# Patient Record
Sex: MALE | Race: WHITE | NOT HISPANIC OR LATINO | Employment: FULL TIME | URBAN - METROPOLITAN AREA
[De-identification: names, ages, dates, MRNs, and addresses within clinical notes are randomized per-mention and may not be internally consistent; named-entity substitution may affect disease eponyms.]

---

## 2017-11-13 ENCOUNTER — HISTORICAL (OUTPATIENT)
Dept: ADMINISTRATIVE | Facility: HOSPITAL | Age: 67
End: 2017-11-13

## 2017-11-14 ENCOUNTER — HISTORICAL (OUTPATIENT)
Dept: ADMINISTRATIVE | Facility: HOSPITAL | Age: 67
End: 2017-11-14

## 2017-11-14 LAB
BUN SERPL-MCNC: 14 MG/DL (ref 7–18)
CALCIUM SERPL-MCNC: 8.9 MG/DL (ref 8.5–10.1)
CHLORIDE SERPL-SCNC: 104 MMOL/L (ref 98–107)
CHOLEST SERPL-MCNC: 202 MG/DL (ref 0–200)
CHOLEST/HDLC SERPL: 3.5 {RATIO} (ref 0–5)
CO2 SERPL-SCNC: 29 MMOL/L (ref 21–32)
CREAT SERPL-MCNC: 1.04 MG/DL (ref 0.7–1.3)
GLUCOSE SERPL-MCNC: 114 MG/DL (ref 74–106)
HDLC SERPL-MCNC: 57 MG/DL (ref 35–60)
LDLC SERPL CALC-MCNC: 107 MG/DL (ref 0–129)
POTASSIUM SERPL-SCNC: 4.3 MMOL/L (ref 3.5–5.1)
SODIUM SERPL-SCNC: 139 MMOL/L (ref 136–145)
TRIGL SERPL-MCNC: 191 MG/DL (ref 30–150)
VLDLC SERPL CALC-MCNC: 38 MG/DL

## 2018-02-12 ENCOUNTER — HISTORICAL (OUTPATIENT)
Dept: ADMINISTRATIVE | Facility: HOSPITAL | Age: 68
End: 2018-02-12

## 2018-02-12 LAB
ABS NEUT (OLG): 2.09 X10(3)/MCL (ref 2.1–9.2)
ALBUMIN SERPL-MCNC: 3.8 GM/DL (ref 3.4–5)
ALBUMIN/GLOB SERPL: 1.6 RATIO (ref 1.1–2)
ALP SERPL-CCNC: 79 UNIT/L (ref 50–136)
ALT SERPL-CCNC: 30 UNIT/L (ref 12–78)
AST SERPL-CCNC: 20 UNIT/L (ref 15–37)
BASOPHILS # BLD AUTO: 0 X10(3)/MCL (ref 0–0.2)
BASOPHILS NFR BLD AUTO: 1 %
BILIRUB SERPL-MCNC: 0.7 MG/DL (ref 0.2–1)
BILIRUBIN DIRECT+TOT PNL SERPL-MCNC: 0.1 MG/DL (ref 0–0.5)
BILIRUBIN DIRECT+TOT PNL SERPL-MCNC: 0.6 MG/DL (ref 0–0.8)
BUN SERPL-MCNC: 12 MG/DL (ref 7–18)
CALCIUM SERPL-MCNC: 8.9 MG/DL (ref 8.5–10.1)
CHLORIDE SERPL-SCNC: 104 MMOL/L (ref 98–107)
CHOLEST SERPL-MCNC: 163 MG/DL (ref 0–200)
CHOLEST/HDLC SERPL: 3.1 {RATIO} (ref 0–5)
CO2 SERPL-SCNC: 30 MMOL/L (ref 21–32)
CREAT SERPL-MCNC: 0.93 MG/DL (ref 0.7–1.3)
EOSINOPHIL # BLD AUTO: 0.1 X10(3)/MCL (ref 0–0.9)
EOSINOPHIL NFR BLD AUTO: 3 %
ERYTHROCYTE [DISTWIDTH] IN BLOOD BY AUTOMATED COUNT: 11.4 % (ref 11.5–17)
GLOBULIN SER-MCNC: 2.4 GM/DL (ref 2.4–3.5)
GLUCOSE SERPL-MCNC: 118 MG/DL (ref 74–106)
HCT VFR BLD AUTO: 46.9 % (ref 42–52)
HDLC SERPL-MCNC: 53 MG/DL (ref 35–60)
HGB BLD-MCNC: 15.5 GM/DL (ref 14–18)
LDLC SERPL CALC-MCNC: 92 MG/DL (ref 0–129)
LYMPHOCYTES # BLD AUTO: 1.4 X10(3)/MCL (ref 0.6–4.6)
LYMPHOCYTES NFR BLD AUTO: 34 %
MCH RBC QN AUTO: 32.4 PG (ref 27–31)
MCHC RBC AUTO-ENTMCNC: 33 GM/DL (ref 33–36)
MCV RBC AUTO: 97.9 FL (ref 80–94)
MONOCYTES # BLD AUTO: 0.4 X10(3)/MCL (ref 0.1–1.3)
MONOCYTES NFR BLD AUTO: 10 %
NEUTROPHILS # BLD AUTO: 2.09 X10(3)/MCL (ref 1.4–7.9)
NEUTROPHILS NFR BLD AUTO: 51 %
PLATELET # BLD AUTO: 221 X10(3)/MCL (ref 130–400)
PMV BLD AUTO: 10.6 FL (ref 9.4–12.4)
POTASSIUM SERPL-SCNC: 4.4 MMOL/L (ref 3.5–5.1)
PROT SERPL-MCNC: 6.2 GM/DL (ref 6.4–8.2)
PSA SERPL-MCNC: 0.98 NG/ML (ref 0–4)
RBC # BLD AUTO: 4.79 X10(6)/MCL (ref 4.7–6.1)
SODIUM SERPL-SCNC: 140 MMOL/L (ref 136–145)
TRIGL SERPL-MCNC: 92 MG/DL (ref 30–150)
VLDLC SERPL CALC-MCNC: 18 MG/DL
WBC # SPEC AUTO: 4.1 X10(3)/MCL (ref 4.5–11.5)

## 2018-05-22 ENCOUNTER — HISTORICAL (OUTPATIENT)
Dept: ADMINISTRATIVE | Facility: HOSPITAL | Age: 68
End: 2018-05-22

## 2018-07-11 ENCOUNTER — HISTORICAL (OUTPATIENT)
Dept: ADMINISTRATIVE | Facility: HOSPITAL | Age: 68
End: 2018-07-11

## 2018-08-23 ENCOUNTER — HISTORICAL (OUTPATIENT)
Dept: ADMINISTRATIVE | Facility: HOSPITAL | Age: 68
End: 2018-08-23

## 2018-08-23 LAB
BUN SERPL-MCNC: 21 MG/DL (ref 7–18)
CALCIUM SERPL-MCNC: 9.1 MG/DL (ref 8.5–10.1)
CHLORIDE SERPL-SCNC: 107 MMOL/L (ref 98–107)
CHOLEST SERPL-MCNC: 263 MG/DL (ref 0–200)
CHOLEST/HDLC SERPL: 5 {RATIO} (ref 0–5)
CO2 SERPL-SCNC: 30 MMOL/L (ref 21–32)
CREAT SERPL-MCNC: 0.98 MG/DL (ref 0.7–1.3)
CREAT/UREA NIT SERPL: 21.4
GLUCOSE SERPL-MCNC: 119 MG/DL (ref 74–106)
HDLC SERPL-MCNC: 53 MG/DL (ref 35–60)
LDLC SERPL CALC-MCNC: 160 MG/DL (ref 0–129)
POTASSIUM SERPL-SCNC: 4 MMOL/L (ref 3.5–5.1)
SODIUM SERPL-SCNC: 143 MMOL/L (ref 136–145)
TRIGL SERPL-MCNC: 250 MG/DL (ref 30–150)
VLDLC SERPL CALC-MCNC: 50 MG/DL

## 2018-11-20 ENCOUNTER — HISTORICAL (OUTPATIENT)
Dept: HEPATOLOGY | Facility: HOSPITAL | Age: 68
End: 2018-11-20

## 2018-11-20 LAB — POC CREATININE: 0.9 MG/DL (ref 0.6–1.3)

## 2018-11-28 ENCOUNTER — HISTORICAL (OUTPATIENT)
Dept: ADMINISTRATIVE | Facility: HOSPITAL | Age: 68
End: 2018-11-28

## 2018-11-28 LAB
CHOLEST SERPL-MCNC: 206 MG/DL (ref 0–200)
CHOLEST/HDLC SERPL: 3.7 {RATIO} (ref 0–5)
CRP SERPL HS-MCNC: 0.57 MG/L (ref 0–3)
EST. AVERAGE GLUCOSE BLD GHB EST-MCNC: 126 MG/DL
GLUCOSE P FAST SERPL-MCNC: 100 MG/DL (ref 70–115)
HBA1C MFR BLD: 6 % (ref 4.2–6.3)
HDLC SERPL-MCNC: 56 MG/DL (ref 35–60)
LDLC SERPL CALC-MCNC: 119 MG/DL (ref 0–129)
TRIGL SERPL-MCNC: 153 MG/DL (ref 30–150)
VLDLC SERPL CALC-MCNC: 31 MG/DL

## 2018-12-06 ENCOUNTER — HISTORICAL (OUTPATIENT)
Dept: PREADMISSION TESTING | Facility: HOSPITAL | Age: 68
End: 2018-12-06

## 2018-12-06 ENCOUNTER — HISTORICAL (OUTPATIENT)
Dept: LAB | Facility: HOSPITAL | Age: 68
End: 2018-12-06

## 2018-12-06 LAB
ABS NEUT (OLG): 1.85 X10(3)/MCL (ref 2.1–9.2)
ALBUMIN SERPL-MCNC: 4 GM/DL (ref 3.4–5)
ALBUMIN/GLOB SERPL: 1.6 {RATIO}
ALP SERPL-CCNC: 63 UNIT/L (ref 50–136)
ALT SERPL-CCNC: 30 UNIT/L (ref 12–78)
APTT PPP: 28.8 SECOND(S) (ref 24.8–36.9)
AST SERPL-CCNC: 16 UNIT/L (ref 15–37)
BASOPHILS # BLD AUTO: 0 X10(3)/MCL (ref 0–0.2)
BASOPHILS NFR BLD AUTO: 1 %
BILIRUB SERPL-MCNC: 1.1 MG/DL (ref 0.2–1)
BILIRUBIN DIRECT+TOT PNL SERPL-MCNC: 0.2 MG/DL (ref 0–0.2)
BILIRUBIN DIRECT+TOT PNL SERPL-MCNC: 0.9 MG/DL (ref 0–0.8)
BUN SERPL-MCNC: 20 MG/DL (ref 7–18)
CALCIUM SERPL-MCNC: 9.3 MG/DL (ref 8.5–10.1)
CHLORIDE SERPL-SCNC: 102 MMOL/L (ref 98–107)
CO2 SERPL-SCNC: 29 MMOL/L (ref 21–32)
CREAT SERPL-MCNC: 0.99 MG/DL (ref 0.7–1.3)
EOSINOPHIL # BLD AUTO: 0.2 X10(3)/MCL (ref 0–0.9)
EOSINOPHIL NFR BLD AUTO: 5 %
ERYTHROCYTE [DISTWIDTH] IN BLOOD BY AUTOMATED COUNT: 12.1 % (ref 11.5–17)
GLOBULIN SER-MCNC: 2.5 GM/DL (ref 2.4–3.5)
GLUCOSE SERPL-MCNC: 98 MG/DL (ref 74–106)
HCT VFR BLD AUTO: 45.4 % (ref 42–52)
HGB BLD-MCNC: 14.9 GM/DL (ref 14–18)
INR PPP: 1 (ref 0–1.3)
LYMPHOCYTES # BLD AUTO: 1.5 X10(3)/MCL (ref 0.6–4.6)
LYMPHOCYTES NFR BLD AUTO: 37 %
MCH RBC QN AUTO: 32.3 PG (ref 27–31)
MCHC RBC AUTO-ENTMCNC: 32.8 GM/DL (ref 33–36)
MCV RBC AUTO: 98.5 FL (ref 80–94)
MONOCYTES # BLD AUTO: 0.4 X10(3)/MCL (ref 0.1–1.3)
MONOCYTES NFR BLD AUTO: 11 %
NEUTROPHILS # BLD AUTO: 1.85 X10(3)/MCL (ref 2.1–9.2)
NEUTROPHILS NFR BLD AUTO: 45 %
PLATELET # BLD AUTO: 206 X10(3)/MCL (ref 130–400)
PMV BLD AUTO: 11.2 FL (ref 9.4–12.4)
POTASSIUM SERPL-SCNC: 4.2 MMOL/L (ref 3.5–5.1)
PROT SERPL-MCNC: 6.5 GM/DL (ref 6.4–8.2)
PROTHROMBIN TIME: 13.6 SECOND(S) (ref 12.2–14.7)
RBC # BLD AUTO: 4.61 X10(6)/MCL (ref 4.7–6.1)
SODIUM SERPL-SCNC: 141 MMOL/L (ref 136–145)
WBC # SPEC AUTO: 4.1 X10(3)/MCL (ref 4.5–11.5)

## 2018-12-18 ENCOUNTER — HISTORICAL (OUTPATIENT)
Dept: SURGERY | Facility: HOSPITAL | Age: 68
End: 2018-12-18

## 2019-04-08 ENCOUNTER — HISTORICAL (OUTPATIENT)
Dept: ADMINISTRATIVE | Facility: HOSPITAL | Age: 69
End: 2019-04-08

## 2019-04-08 LAB
APPEARANCE, UA: CLEAR
BACTERIA SPEC CULT: NORMAL /HPF
BILIRUB UR QL STRIP: NEGATIVE
COLOR UR: YELLOW
GLUCOSE (UA): NEGATIVE
HGB UR QL STRIP: NEGATIVE
KETONES UR QL STRIP: NEGATIVE
LEUKOCYTE ESTERASE UR QL STRIP: NEGATIVE
NITRITE UR QL STRIP: NEGATIVE
PH UR STRIP: 5.5 [PH] (ref 5–9)
PROT UR QL STRIP: NEGATIVE
RBC #/AREA URNS HPF: NORMAL /[HPF]
SP GR UR STRIP: 1.01 (ref 1–1.03)
SQUAMOUS EPITHELIAL, UA: NORMAL
UROBILINOGEN UR STRIP-ACNC: 0.2
WBC #/AREA URNS HPF: NORMAL /HPF

## 2019-04-10 ENCOUNTER — HISTORICAL (OUTPATIENT)
Dept: ADMINISTRATIVE | Facility: HOSPITAL | Age: 69
End: 2019-04-10

## 2019-06-25 ENCOUNTER — HISTORICAL (OUTPATIENT)
Dept: ADMINISTRATIVE | Facility: HOSPITAL | Age: 69
End: 2019-06-25

## 2019-06-25 LAB
ABS NEUT (OLG): 1.97 X10(3)/MCL (ref 2.1–9.2)
ALBUMIN SERPL-MCNC: 3.8 GM/DL (ref 3.4–5)
ALBUMIN/GLOB SERPL: 1.5 RATIO (ref 1.1–2)
ALP SERPL-CCNC: 70 UNIT/L (ref 50–136)
ALT SERPL-CCNC: 26 UNIT/L (ref 12–78)
APPEARANCE, UA: CLEAR
AST SERPL-CCNC: 18 UNIT/L (ref 15–37)
BACTERIA SPEC CULT: NORMAL /HPF
BASOPHILS # BLD AUTO: 0 X10(3)/MCL (ref 0–0.2)
BASOPHILS NFR BLD AUTO: 1 %
BILIRUB SERPL-MCNC: 0.6 MG/DL (ref 0.2–1)
BILIRUB UR QL STRIP: NEGATIVE
BILIRUBIN DIRECT+TOT PNL SERPL-MCNC: 0.2 MG/DL (ref 0–0.5)
BILIRUBIN DIRECT+TOT PNL SERPL-MCNC: 0.4 MG/DL (ref 0–0.8)
BUN SERPL-MCNC: 15 MG/DL (ref 7–18)
CALCIUM SERPL-MCNC: 9.4 MG/DL (ref 8.5–10.1)
CHLORIDE SERPL-SCNC: 108 MMOL/L (ref 98–107)
CHOLEST SERPL-MCNC: 165 MG/DL (ref 0–200)
CHOLEST/HDLC SERPL: 2.8 {RATIO} (ref 0–5)
CO2 SERPL-SCNC: 29 MMOL/L (ref 21–32)
COLOR UR: YELLOW
CREAT SERPL-MCNC: 1.01 MG/DL (ref 0.7–1.3)
EOSINOPHIL # BLD AUTO: 0.2 X10(3)/MCL (ref 0–0.9)
EOSINOPHIL NFR BLD AUTO: 4 %
ERYTHROCYTE [DISTWIDTH] IN BLOOD BY AUTOMATED COUNT: 12.4 % (ref 11.5–17)
EST. AVERAGE GLUCOSE BLD GHB EST-MCNC: 111 MG/DL
GLOBULIN SER-MCNC: 2.5 GM/DL (ref 2.4–3.5)
GLUCOSE (UA): NEGATIVE
GLUCOSE SERPL-MCNC: 111 MG/DL (ref 74–106)
HBA1C MFR BLD: 5.5 % (ref 4.2–6.3)
HCT VFR BLD AUTO: 45.3 % (ref 42–52)
HDLC SERPL-MCNC: 58 MG/DL (ref 35–60)
HGB BLD-MCNC: 14.7 GM/DL (ref 14–18)
HGB UR QL STRIP: NEGATIVE
KETONES UR QL STRIP: NEGATIVE
LDLC SERPL CALC-MCNC: 77 MG/DL (ref 0–129)
LEUKOCYTE ESTERASE UR QL STRIP: NEGATIVE
LYMPHOCYTES # BLD AUTO: 1.2 X10(3)/MCL (ref 0.6–4.6)
LYMPHOCYTES NFR BLD AUTO: 31 %
MCH RBC QN AUTO: 32 PG (ref 27–31)
MCHC RBC AUTO-ENTMCNC: 32.5 GM/DL (ref 33–36)
MCV RBC AUTO: 98.7 FL (ref 80–94)
MONOCYTES # BLD AUTO: 0.4 X10(3)/MCL (ref 0.1–1.3)
MONOCYTES NFR BLD AUTO: 12 %
NEUTROPHILS # BLD AUTO: 1.97 X10(3)/MCL (ref 2.1–9.2)
NEUTROPHILS NFR BLD AUTO: 52 %
NITRITE UR QL STRIP: NEGATIVE
PH UR STRIP: 5 [PH] (ref 5–9)
PLATELET # BLD AUTO: 182 X10(3)/MCL (ref 130–400)
PMV BLD AUTO: 11 FL (ref 9.4–12.4)
POTASSIUM SERPL-SCNC: 4.3 MMOL/L (ref 3.5–5.1)
PROT SERPL-MCNC: 6.3 GM/DL (ref 6.4–8.2)
PROT UR QL STRIP: NEGATIVE
PSA SERPL-MCNC: 1.09 NG/ML (ref 0–4)
RBC # BLD AUTO: 4.59 X10(6)/MCL (ref 4.7–6.1)
RBC #/AREA URNS HPF: NORMAL /[HPF]
SODIUM SERPL-SCNC: 144 MMOL/L (ref 136–145)
SP GR UR STRIP: 1.02 (ref 1–1.03)
SQUAMOUS EPITHELIAL, UA: NORMAL
TRIGL SERPL-MCNC: 152 MG/DL (ref 30–150)
UROBILINOGEN UR STRIP-ACNC: 0.2
VLDLC SERPL CALC-MCNC: 30 MG/DL
WBC # SPEC AUTO: 3.8 X10(3)/MCL (ref 4.5–11.5)
WBC #/AREA URNS HPF: NORMAL /HPF

## 2019-07-01 ENCOUNTER — HISTORICAL (OUTPATIENT)
Dept: ADMINISTRATIVE | Facility: HOSPITAL | Age: 69
End: 2019-07-01

## 2019-07-02 ENCOUNTER — HISTORICAL (OUTPATIENT)
Dept: RADIOLOGY | Facility: HOSPITAL | Age: 69
End: 2019-07-02

## 2019-07-02 LAB — POC CREATININE: 0.8 MG/DL (ref 0.6–1.3)

## 2019-12-26 ENCOUNTER — HISTORICAL (OUTPATIENT)
Dept: RADIOLOGY | Facility: HOSPITAL | Age: 69
End: 2019-12-26

## 2019-12-26 LAB — POC CREATININE: 0.9 MG/DL (ref 0.6–1.3)

## 2020-01-03 ENCOUNTER — HOSPITAL ENCOUNTER (OUTPATIENT)
Dept: MEDSURG UNIT | Facility: HOSPITAL | Age: 70
End: 2020-01-05
Attending: INTERNAL MEDICINE | Admitting: INTERNAL MEDICINE

## 2020-01-03 LAB
ABS NEUT (OLG): 6.97 X10(3)/MCL (ref 2.1–9.2)
ALBUMIN SERPL-MCNC: 3.9 GM/DL (ref 3.4–5)
ALBUMIN/GLOB SERPL: 1.3 RATIO (ref 1.1–2)
ALP SERPL-CCNC: 88 UNIT/L (ref 50–136)
ALT SERPL-CCNC: 37 UNIT/L (ref 12–78)
APPEARANCE, UA: CLEAR
AST SERPL-CCNC: 24 UNIT/L (ref 15–37)
BACTERIA SPEC CULT: ABNORMAL /HPF
BASOPHILS # BLD AUTO: 0 X10(3)/MCL (ref 0–0.2)
BASOPHILS NFR BLD AUTO: 0 %
BILIRUB SERPL-MCNC: 0.3 MG/DL (ref 0.2–1)
BILIRUB UR QL STRIP: NEGATIVE
BILIRUBIN DIRECT+TOT PNL SERPL-MCNC: 0.1 MG/DL (ref 0–0.5)
BILIRUBIN DIRECT+TOT PNL SERPL-MCNC: 0.2 MG/DL (ref 0–0.8)
BNP BLD-MCNC: 51 PG/ML (ref 0–125)
BUN SERPL-MCNC: 4 MG/DL (ref 7–18)
CALCIUM SERPL-MCNC: 9.2 MG/DL (ref 8.5–10.1)
CHLORIDE SERPL-SCNC: 104 MMOL/L (ref 98–107)
CO2 SERPL-SCNC: 27 MMOL/L (ref 21–32)
COLOR UR: YELLOW
CREAT SERPL-MCNC: 1.28 MG/DL (ref 0.7–1.3)
D DIMER PPP IA.FEU-MCNC: 427 NG/ML FEU (ref 0–500)
EOSINOPHIL # BLD AUTO: 0.1 X10(3)/MCL (ref 0–0.9)
EOSINOPHIL NFR BLD AUTO: 1 %
ERYTHROCYTE [DISTWIDTH] IN BLOOD BY AUTOMATED COUNT: 12.7 % (ref 11.5–17)
GLOBULIN SER-MCNC: 3 GM/DL (ref 2.4–3.5)
GLUCOSE (UA): NEGATIVE
GLUCOSE SERPL-MCNC: 120 MG/DL (ref 74–106)
HCT VFR BLD AUTO: 45.1 % (ref 42–52)
HGB BLD-MCNC: 14.4 GM/DL (ref 14–18)
HGB UR QL STRIP: NEGATIVE
KETONES UR QL STRIP: ABNORMAL
LEUKOCYTE ESTERASE UR QL STRIP: NEGATIVE
LIPASE SERPL-CCNC: 149 UNIT/L (ref 73–393)
LYMPHOCYTES # BLD AUTO: 2.2 X10(3)/MCL (ref 0.6–4.6)
LYMPHOCYTES NFR BLD AUTO: 22 %
MCH RBC QN AUTO: 32.6 PG (ref 27–31)
MCHC RBC AUTO-ENTMCNC: 31.9 GM/DL (ref 33–36)
MCV RBC AUTO: 102 FL (ref 80–94)
MONOCYTES # BLD AUTO: 0.8 X10(3)/MCL (ref 0.1–1.3)
MONOCYTES NFR BLD AUTO: 8 %
NEUTROPHILS # BLD AUTO: 6.97 X10(3)/MCL (ref 2.1–9.2)
NEUTROPHILS NFR BLD AUTO: 68 %
NITRITE UR QL STRIP: NEGATIVE
PH UR STRIP: 5 [PH] (ref 5–9)
PLATELET # BLD AUTO: 206 X10(3)/MCL (ref 130–400)
PMV BLD AUTO: 10.9 FL (ref 9.4–12.4)
POTASSIUM SERPL-SCNC: 4.8 MMOL/L (ref 3.5–5.1)
PROT SERPL-MCNC: 6.9 GM/DL (ref 6.4–8.2)
PROT UR QL STRIP: NEGATIVE
RBC # BLD AUTO: 4.42 X10(6)/MCL (ref 4.7–6.1)
RBC #/AREA URNS HPF: ABNORMAL /[HPF]
SODIUM SERPL-SCNC: 140 MMOL/L (ref 136–145)
SP GR UR STRIP: 1.03 (ref 1–1.03)
SQUAMOUS EPITHELIAL, UA: ABNORMAL
TROPONIN I SERPL-MCNC: <0.02 NG/ML (ref 0.02–0.49)
UROBILINOGEN UR STRIP-ACNC: 1
WBC # SPEC AUTO: 10.2 X10(3)/MCL (ref 4.5–11.5)
WBC #/AREA URNS HPF: ABNORMAL /HPF

## 2020-01-04 LAB
ABS NEUT (OLG): 10.55 X10(3)/MCL (ref 2.1–9.2)
APTT PPP: 24 SECOND(S) (ref 24.2–33.9)
BASOPHILS # BLD AUTO: 0 X10(3)/MCL (ref 0–0.2)
BASOPHILS NFR BLD AUTO: 0 %
EOSINOPHIL # BLD AUTO: 0 X10(3)/MCL (ref 0–0.9)
EOSINOPHIL NFR BLD AUTO: 0 %
ERYTHROCYTE [DISTWIDTH] IN BLOOD BY AUTOMATED COUNT: 12.7 % (ref 11.5–17)
HCT VFR BLD AUTO: 41.5 % (ref 42–52)
HGB BLD-MCNC: 13.7 GM/DL (ref 14–18)
INR PPP: 0.9 (ref 0–1.3)
LYMPHOCYTES # BLD AUTO: 1.6 X10(3)/MCL (ref 0.6–4.6)
LYMPHOCYTES NFR BLD AUTO: 12 %
MCH RBC QN AUTO: 33.5 PG (ref 27–31)
MCHC RBC AUTO-ENTMCNC: 33 GM/DL (ref 33–36)
MCV RBC AUTO: 101.5 FL (ref 80–94)
MONOCYTES # BLD AUTO: 0.6 X10(3)/MCL (ref 0.1–1.3)
MONOCYTES NFR BLD AUTO: 5 %
NEUTROPHILS # BLD AUTO: 10.55 X10(3)/MCL (ref 2.1–9.2)
NEUTROPHILS NFR BLD AUTO: 82 %
PLATELET # BLD AUTO: 199 X10(3)/MCL (ref 130–400)
PMV BLD AUTO: 10.7 FL (ref 9.4–12.4)
PROTHROMBIN TIME: 12.4 SECOND(S) (ref 12–14)
RBC # BLD AUTO: 4.09 X10(6)/MCL (ref 4.7–6.1)
WBC # SPEC AUTO: 12.9 X10(3)/MCL (ref 4.5–11.5)

## 2020-01-05 LAB
ABS NEUT (OLG): 9.14 X10(3)/MCL (ref 2.1–9.2)
BASOPHILS # BLD AUTO: 0 X10(3)/MCL (ref 0–0.2)
BASOPHILS NFR BLD AUTO: 0 %
ERYTHROCYTE [DISTWIDTH] IN BLOOD BY AUTOMATED COUNT: 12.7 % (ref 11.5–17)
HCT VFR BLD AUTO: 36.6 % (ref 42–52)
HGB BLD-MCNC: 11.9 GM/DL (ref 14–18)
LYMPHOCYTES # BLD AUTO: 1 X10(3)/MCL (ref 0.6–4.6)
LYMPHOCYTES NFR BLD AUTO: 9 %
MCH RBC QN AUTO: 33 PG (ref 27–31)
MCHC RBC AUTO-ENTMCNC: 32.5 GM/DL (ref 33–36)
MCV RBC AUTO: 101.4 FL (ref 80–94)
MONOCYTES # BLD AUTO: 0.6 X10(3)/MCL (ref 0.1–1.3)
MONOCYTES NFR BLD AUTO: 6 %
NEUTROPHILS # BLD AUTO: 9.14 X10(3)/MCL (ref 2.1–9.2)
NEUTROPHILS NFR BLD AUTO: 84 %
PLATELET # BLD AUTO: 168 X10(3)/MCL (ref 130–400)
PMV BLD AUTO: 10.9 FL (ref 9.4–12.4)
RBC # BLD AUTO: 3.61 X10(6)/MCL (ref 4.7–6.1)
WBC # SPEC AUTO: 10.9 X10(3)/MCL (ref 4.5–11.5)

## 2020-01-07 ENCOUNTER — HISTORICAL (OUTPATIENT)
Dept: ADMINISTRATIVE | Facility: HOSPITAL | Age: 70
End: 2020-01-07

## 2020-01-10 ENCOUNTER — HISTORICAL (OUTPATIENT)
Dept: ADMINISTRATIVE | Facility: HOSPITAL | Age: 70
End: 2020-01-10

## 2020-01-10 LAB
ABS NEUT (OLG): 4.78 X10(3)/MCL (ref 2.1–9.2)
BASOPHILS # BLD AUTO: 0 X10(3)/MCL (ref 0–0.2)
BASOPHILS NFR BLD AUTO: 1 %
EOSINOPHIL # BLD AUTO: 0.1 X10(3)/MCL (ref 0–0.9)
EOSINOPHIL NFR BLD AUTO: 2 %
ERYTHROCYTE [DISTWIDTH] IN BLOOD BY AUTOMATED COUNT: 13.2 % (ref 11.5–17)
FERRITIN SERPL-MCNC: 253.9 NG/ML (ref 8–388)
HCT VFR BLD AUTO: 41.1 % (ref 42–52)
HGB BLD-MCNC: 13 GM/DL (ref 14–18)
IRON SATN MFR SERPL: 28.4 % (ref 20–50)
IRON SERPL-MCNC: 84 MCG/DL (ref 50–175)
LYMPHOCYTES # BLD AUTO: 1.3 X10(3)/MCL (ref 0.6–4.6)
LYMPHOCYTES NFR BLD AUTO: 19 %
MCH RBC QN AUTO: 32.5 PG (ref 27–31)
MCHC RBC AUTO-ENTMCNC: 31.6 GM/DL (ref 33–36)
MCV RBC AUTO: 102.8 FL (ref 80–94)
MONOCYTES # BLD AUTO: 0.6 X10(3)/MCL (ref 0.1–1.3)
MONOCYTES NFR BLD AUTO: 9 %
NEUTROPHILS # BLD AUTO: 4.78 X10(3)/MCL (ref 2.1–9.2)
NEUTROPHILS NFR BLD AUTO: 67 %
PLATELET # BLD AUTO: 206 X10(3)/MCL (ref 130–400)
PMV BLD AUTO: 10.3 FL (ref 9.4–12.4)
RBC # BLD AUTO: 4 X10(6)/MCL (ref 4.7–6.1)
TIBC SERPL-MCNC: 296 MCG/DL (ref 250–450)
TRANSFERRIN SERPL-MCNC: 220 MG/DL (ref 200–360)
WBC # SPEC AUTO: 7.1 X10(3)/MCL (ref 4.5–11.5)

## 2020-02-07 ENCOUNTER — HISTORICAL (OUTPATIENT)
Dept: CARDIOLOGY | Facility: HOSPITAL | Age: 70
End: 2020-02-07

## 2020-02-19 ENCOUNTER — HISTORICAL (OUTPATIENT)
Dept: ADMINISTRATIVE | Facility: HOSPITAL | Age: 70
End: 2020-02-19

## 2020-02-19 LAB
BUN SERPL-MCNC: 15 MG/DL (ref 7–18)
CALCIUM SERPL-MCNC: 8.9 MG/DL (ref 8.5–10.1)
CHLORIDE SERPL-SCNC: 108 MMOL/L (ref 98–107)
CHOLEST SERPL-MCNC: 163 MG/DL (ref 0–200)
CHOLEST/HDLC SERPL: 2.5 {RATIO} (ref 0–5)
CO2 SERPL-SCNC: 28 MMOL/L (ref 21–32)
CREAT SERPL-MCNC: 0.85 MG/DL (ref 0.7–1.3)
CREAT/UREA NIT SERPL: 17.6
EST. AVERAGE GLUCOSE BLD GHB EST-MCNC: 100 MG/DL
GLUCOSE SERPL-MCNC: 109 MG/DL (ref 74–106)
HBA1C MFR BLD: 5.1 % (ref 4.2–6.3)
HDLC SERPL-MCNC: 64 MG/DL (ref 35–60)
LDLC SERPL CALC-MCNC: 81 MG/DL (ref 0–129)
POTASSIUM SERPL-SCNC: 4.3 MMOL/L (ref 3.5–5.1)
SODIUM SERPL-SCNC: 142 MMOL/L (ref 136–145)
TRIGL SERPL-MCNC: 88 MG/DL (ref 30–150)
VLDLC SERPL CALC-MCNC: 18 MG/DL

## 2020-06-30 LAB — CRC RECOMMENDATION EXT: NORMAL

## 2021-05-13 ENCOUNTER — HISTORICAL (OUTPATIENT)
Dept: ADMINISTRATIVE | Facility: HOSPITAL | Age: 71
End: 2021-05-13

## 2021-05-13 LAB
ABS NEUT (OLG): 1.98 X10(3)/MCL (ref 2.1–9.2)
ALBUMIN SERPL-MCNC: 4 GM/DL (ref 3.4–4.8)
ALBUMIN/GLOB SERPL: 1.7 RATIO (ref 1.1–2)
ALP SERPL-CCNC: 62 UNIT/L (ref 40–150)
ALT SERPL-CCNC: 21 UNIT/L (ref 0–55)
APPEARANCE, UA: CLEAR
AST SERPL-CCNC: 16 UNIT/L (ref 5–34)
BACTERIA SPEC CULT: NORMAL /HPF
BASOPHILS # BLD AUTO: 0 X10(3)/MCL (ref 0–0.2)
BASOPHILS NFR BLD AUTO: 1 %
BILIRUB SERPL-MCNC: 0.6 MG/DL
BILIRUB UR QL STRIP: NEGATIVE
BILIRUBIN DIRECT+TOT PNL SERPL-MCNC: 0.2 MG/DL (ref 0–0.5)
BILIRUBIN DIRECT+TOT PNL SERPL-MCNC: 0.4 MG/DL (ref 0–0.8)
BUN SERPL-MCNC: 18.7 MG/DL (ref 8.4–25.7)
CALCIUM SERPL-MCNC: 9.7 MG/DL (ref 8.8–10)
CHLORIDE SERPL-SCNC: 105 MMOL/L (ref 98–107)
CHOLEST SERPL-MCNC: 191 MG/DL
CHOLEST/HDLC SERPL: 4 {RATIO} (ref 0–5)
CO2 SERPL-SCNC: 28 MMOL/L (ref 23–31)
COLOR UR: YELLOW
CREAT SERPL-MCNC: 0.92 MG/DL (ref 0.73–1.18)
EOSINOPHIL # BLD AUTO: 0.2 X10(3)/MCL (ref 0–0.9)
EOSINOPHIL NFR BLD AUTO: 4 %
ERYTHROCYTE [DISTWIDTH] IN BLOOD BY AUTOMATED COUNT: 12.2 % (ref 11.5–17)
GLOBULIN SER-MCNC: 2.3 GM/DL (ref 2.4–3.5)
GLUCOSE (UA): NEGATIVE
GLUCOSE SERPL-MCNC: 120 MG/DL (ref 82–115)
HCT VFR BLD AUTO: 46.2 % (ref 42–52)
HDLC SERPL-MCNC: 53 MG/DL (ref 35–60)
HGB BLD-MCNC: 15.1 GM/DL (ref 14–18)
HGB UR QL STRIP: NEGATIVE
KETONES UR QL STRIP: NEGATIVE
LDLC SERPL CALC-MCNC: 111 MG/DL (ref 50–140)
LEUKOCYTE ESTERASE UR QL STRIP: NEGATIVE
LYMPHOCYTES # BLD AUTO: 1.1 X10(3)/MCL (ref 0.6–4.6)
LYMPHOCYTES NFR BLD AUTO: 29 %
MCH RBC QN AUTO: 32.8 PG (ref 27–31)
MCHC RBC AUTO-ENTMCNC: 32.7 GM/DL (ref 33–36)
MCV RBC AUTO: 100.4 FL (ref 80–94)
MONOCYTES # BLD AUTO: 0.5 X10(3)/MCL (ref 0.1–1.3)
MONOCYTES NFR BLD AUTO: 12 %
NEUTROPHILS # BLD AUTO: 1.98 X10(3)/MCL (ref 2.1–9.2)
NEUTROPHILS NFR BLD AUTO: 52 %
NITRITE UR QL STRIP: NEGATIVE
PH UR STRIP: 5.5 [PH] (ref 5–9)
PLATELET # BLD AUTO: 174 X10(3)/MCL (ref 130–400)
PMV BLD AUTO: 11.1 FL (ref 9.4–12.4)
POTASSIUM SERPL-SCNC: 4.6 MMOL/L (ref 3.5–5.1)
PROT SERPL-MCNC: 6.3 GM/DL (ref 5.8–7.6)
PROT UR QL STRIP: NEGATIVE
PSA SERPL-MCNC: 1.15 NG/ML
RBC # BLD AUTO: 4.6 X10(6)/MCL (ref 4.7–6.1)
RBC #/AREA URNS HPF: NORMAL /[HPF]
SODIUM SERPL-SCNC: 144 MMOL/L (ref 136–145)
SP GR UR STRIP: 1.02 (ref 1–1.03)
SQUAMOUS EPITHELIAL, UA: NORMAL /HPF (ref 0–4)
TRIGL SERPL-MCNC: 134 MG/DL (ref 34–140)
UROBILINOGEN UR STRIP-ACNC: 0.2
VLDLC SERPL CALC-MCNC: 27 MG/DL
WBC # SPEC AUTO: 3.8 X10(3)/MCL (ref 4.5–11.5)
WBC #/AREA URNS HPF: NORMAL /HPF

## 2022-01-14 ENCOUNTER — HISTORICAL (OUTPATIENT)
Dept: ADMINISTRATIVE | Facility: HOSPITAL | Age: 72
End: 2022-01-14

## 2022-01-14 LAB
BUN SERPL-MCNC: 19.4 MG/DL (ref 8.4–25.7)
CALCIUM SERPL-MCNC: 10.6 MG/DL (ref 8.7–10.5)
CHLORIDE SERPL-SCNC: 105 MMOL/L (ref 98–107)
CHOLEST SERPL-MCNC: 140 MG/DL
CHOLEST/HDLC SERPL: 2 {RATIO} (ref 0–5)
CO2 SERPL-SCNC: 29 MMOL/L (ref 23–31)
CREAT SERPL-MCNC: 1.01 MG/DL (ref 0.73–1.18)
CREAT/UREA NIT SERPL: 19
EST. AVERAGE GLUCOSE BLD GHB EST-MCNC: 111.2 MG/DL
GLUCOSE SERPL-MCNC: 109 MG/DL (ref 82–115)
HBA1C MFR BLD: 5.5 %
HDLC SERPL-MCNC: 56 MG/DL (ref 35–60)
LDLC SERPL CALC-MCNC: 66 MG/DL (ref 50–140)
POTASSIUM SERPL-SCNC: 4.9 MMOL/L (ref 3.5–5.1)
SODIUM SERPL-SCNC: 143 MMOL/L (ref 136–145)
TRIGL SERPL-MCNC: 92 MG/DL (ref 34–140)
VLDLC SERPL CALC-MCNC: 18 MG/DL

## 2022-04-10 ENCOUNTER — HISTORICAL (OUTPATIENT)
Dept: ADMINISTRATIVE | Facility: HOSPITAL | Age: 72
End: 2022-04-10
Payer: MEDICARE

## 2022-04-29 VITALS
WEIGHT: 179.44 LBS | OXYGEN SATURATION: 99 % | BODY MASS INDEX: 28.16 KG/M2 | DIASTOLIC BLOOD PRESSURE: 92 MMHG | HEIGHT: 67 IN | SYSTOLIC BLOOD PRESSURE: 169 MMHG
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
## 2022-04-30 NOTE — OP NOTE
DATE OF SURGERY:    12/18/2018    SURGEON:  Maximilian Hitchcock MD  ASSISTANT:  JULIET Alamo    PREOPERATIVE DIAGNOSES:    1. Left L2-3 herniated nucleus pulposus with radiculopathy.  2. Lumbar spinal stenosis L2-3 with neurogenic claudication.  3. Low back pain.  4. Lumbar degenerative disk disease.    POSTOPERATIVE DIAGNOSES:    1. Left L2-3 herniated nucleus pulposus with radiculopathy.  2. Lumbar spinal stenosis L2-3 with neurogenic claudication.  3. Low back pain.  4. Lumbar degenerative disk disease.    PROCEDURE PERFORMED:    1. Left L2-3 laminectomy, bilateral medial facetectomies, bilateral L3 foraminotomies, and left L2-3 microdiskectomy.  2. Operative microscopic dissection.    INDICATIONS FOR PROCEDURE:  Mr. Reddy Thapa is a 68-year-old male who presented to my clinic with complaints of back pain, bilateral lower extremity radiculopathy radiating down to the knee.  His leg pain was relieved with forward bent posture.  He rated his pain as 8/10 on average, 10/10 at worse.  He had been experiencing this for greater than 6-12 months.  Patient underwent anti-inflammatory administration for greater than 2 months, physical therapy for greater than 2 months.  He underwent application of cold and heat, and despite all these extensive conservative measures, he failed conservative therapy.  He had previously had a L4-S1 fusion.  He is intact on exam.  MRI of the lumbar spine noted L2-3 spinal canal stenosis.  He had a left-sided L2-3 disk herniation resulting in canal stenosis.  Patient was counseled extensively on his options.  He elected to undergo operative decompression.    PROCEDURE IN DETAIL:  After informed consent was obtained, patient was brought to the operating room, placed under general anesthesia, intubated by the anesthesia team, transferred to the operative table in the prone position, appropriately padded, prepped and draped in the usual sterile fashion.       I began the procedure by making  a dorsal midline incision fluoroscopically located over the L2-3 vertebral body level.  Dissection was carried down to the underlying dorsal lumbar fascia.  Left-sided paramedian incision was made.  Tubular dilatation occurred over the trailing edge of the L2 lamina.  Fluoroscopy confirmed level.     The operative microscope was brought in, where a high-speed pneumatic bur was used to perform hemilaminectomy and medial facetectomy.  I carried the hemilaminectomy up to the cranial edge of the yellow ligament, undermined with a 6-0 angled curette, excised with a 2 Kerrison punch.  I identified the traversing L3 nerve root, decompressed it in the lateral recess with a 2 Kerrison punch, as well as the proximal neural foramen.  I then gently retracted this nerve root medially.  Very soft disk herniation was appreciated underneath.  An 11 blade was used to perform an annulotomy.  Pituitary rongeur removed the multiple small soft disk fragments.  Once the ventral surface of the canal was deemed decompressed, I was also able to decompress the right side of the canal using angulation and long nerve hooks and angled pituitaries.  There was no evidence of herniated disk within the canal.  The nerve was released.  The disk space was inspected.  It was very large, densely cartilaginous disk bulge that was across the entire disk space.  An 11 blade was used to incise this and a pituitary rongeur was used.  However, this was very fibrous and calcified, which was not amenable to removal because of its tenacious nature, this would have necessitated removal of the entire posterior disk, which would destabilize that segment.  Therefore, a decision was made not to perform right-sided micro disk because I felt I removed all the soft ventral disk from the unilateral approach.     I then proceeded to perform a hemilaminectomy on the right and medial facetectomy.  This was done by angling my tubular retractor medially.  I drilled off the  inferior portion of the spinous process as well as drilling off the inferior portion of the lamina on the contralateral side and now performed medial facetectomy with the high-speed pneumatic bur.  I then removed the contralateral yellow ligament with a combination of 2 and 3 Kerrison punches.  As I was not going to be looking at the disk at this right side, decision was made not to make a right-sided approach as this would further injure the facet capsule on the right and for stability reasons want to keep this intact.  Once the neural structures were deemed decompressed, copious irrigation washed out the entirety of the incision.  Complete hemostasis was achieved.  Tubular retractor was withdrawn, standard layered closure was performed.  All counts correct x2.    ESTIMATED BLOOD LOSS:  25 cc.    BLOOD REPLACED:  None.    SPECIMEN SENT:  None.    IMPLANTS:  None.      APPARENT OPERATIVE COMPLICATIONS:  None.  Neuromonitoring signals stayed stable throughout the entirety of the case.        ______________________________  MD JAMIA Powell/UN  DD:  12/18/2018  Time:  01:30PM  DT:  12/18/2018  Time:  03:02PM  Job #:  [id number]

## 2022-04-30 NOTE — ED PROVIDER NOTES
Patient:   Reddy Thapa            MRN: [id number]            FIN: [id number]               Age:   69 years     Sex:  Male     :  1950   Associated Diagnoses:   None   Author:   Stoney Cates MD      Addendum      Teaching-Supervisory Addendum-Brief   I participated in the following activities of this patients care: the medical history, the physical exam, medical decision making.   I personally performed: supervision of the patient's care, the medical history, the physical exam, the medical decision making.   The case was discussed with: midlevel provider.   Results interpretation: I agree with the study interpretation in this patient's care.   Notes: Dr. Cates dictating I have reviewed the mid-level note and agree with the findings. I performed an independent history and physical examination the patient had face-to-face time with the patient.    68 yo m c/o Lt upper abd pain, recent fits of coughing, tender indurated area in LUQ of abdomen on exam, on CT - rectus muscle hematoma with small possible active blush,  consulted and advised admit to  service..

## 2022-04-30 NOTE — DISCHARGE SUMMARY
Patient:   Reddy Thapa            MRN: [id number]            FIN: [id number]               Age:   69 years     Sex:  Male     :  1950   Associated Diagnoses:   None   Author:   Rachna HIGHTOWER, Zack      Discharge Information      Discharge Summary Information   Admit/Discharge Dates   Admit Date: 2020  Discharge Date: 2020     Physicians   Attending Physician - Reynaldo HIGHTOWER, Jessica CLEMENTE  Admitting Physician - Reynaldo HIGHTOWER, Jessica CLEMENTE  Consulting Physician - Rachna HIGHTOWER, Zack  Consulting Physician - Marissa ELAM MD, Reddy CARNES  Primary Care Physician - Axel HIGHTOWER, Brice GRESHAM     Discharge Diagnosis   Left rectus sheath hematoma ? Etiology     Acute bronchitis, prob viral     HTN - stable       Procedures   No procedures recorded for this visit.   Immunizations   No immunizations recorded for this visit.     Discharge Medications   Prescribed  codeine-guaifenesin (codeine-guaifenesin 10 mg-100 mg/5 mL oral syrup) 10 mL, Oral, q4hr, PRN as needed for cough  Continue  RABEprazole (RABEPRAZOLE DR 20MG TABLETS) 20 mg, Oral, qAM  aspirin (aspirin 81 mg oral Delayed Release (EC) tablet) 81 mg, Oral, Daily  atorvastatin (ATORVASTATIN 40MG TABLETS) 40 mg, Oral, qPM  escitalopram (ESCITALOPRAM 10 MG TABLET) 10 mg, Oral, qPM  fluticasone nasal (Flonase 50 mcg/inh nasal spray) 2 spray(s), Nasal, BID  metoprolol (METOPROLOL ER SUCCINATE 50MG TABS) 50 mg, Oral, qAM  Discontinue  amoxicillin (Amoxil 875 mg oral tablet) 875 mg, Oral, BID  predniSONE (Deltasone 20 mg oral tablet) 20 mg, Oral, BID        Education   Discharge - 20 8:40:00 CST, Home, Give all scheduled vaccinations prior to discharge.   Discharge Activity - Activity as Tolerated   Discharge Diet - Regular         Followup   Brice Reyna, within 1 to 2 weeks        Hospital Course   Hospital Course   Mr. Thapa is a 69 yr old male who presented to the ED with c/o mild generalized abdominal tenderness onset just prior to arrival. Also  reports a forceful cough ongoing intermittently for one month. Has been treated twice with steroids and antibiotics. CT abdomen/pelvis shows a left rectus sheath hematoma measuring about 8 cm with an area of possible active bleeding and bladder wall thickening with cystitis vs chronic outlet obstruction. CT chest negative for PE. Lung parenchyma showed areas of very mild atelectasis with no strong findings for pneumonia. Initial Hgb 14.4 with only a slight drop to 13.7. He's been seen by surgery and surgical intervention not warranted at this time. Abdominal binder has been ordered. Denies any abdominal trauma. On ASA 81mg daily but no other anticoagulants or antiplatelets. VS stable. Patient comfortable at this time. Pt was also placed on robitussin with codein for his cough. He had no signs and symptoms of sepsis. Pt did well overnight. He will be discharged to home today.   Explained in detail to patient and family about the discharge plan, medications and F/U visits. They understand agree with the treatment plan.   Condition stable  Diet: Low sodium   Meds per dc med rec  Activities as tolerated   F/U with PCP in 1 to 2 weeks  For further questions contact hospitalist office  NH 31 mts   .        Physical Examination   General:  Alert and oriented, No acute distress.    Respiratory:  Lungs are clear to auscultation, Breath sounds are equal.    Cardiovascular:  Normal rate, Regular rhythm, No murmur.    Gastrointestinal:  Soft, Non-tender, Non-distended, Normal bowel sounds.    Neurologic:  No focal deficits, Cranial Nerves II-XII are grossly intact.       Discharge Plan   Education and Follow-up   Counseled: patient, regarding diagnosis, regarding treatment, regarding medications.

## 2022-05-04 NOTE — HISTORICAL [facility] CERNER
This is a historical note converted from [employer]. Formatting and pictures may have been removed.  Please reference [employer] for original formatting and attached multimedia. Chief Complaint  abdominal pain, cough  History of Present Illness  Mr. Thapa is a 69 yr old male who presented to the ED with c/o mild generalized abdominal tenderness onset just prior to arrival. Also reports a forceful cough ongoing intermittently for one month. Has been treated twice with steroids and antibiotics. CT abdomen/pelvis shows a left rectus sheath hematoma measuring about 8 cm with an area of possible active bleeding and bladder wall thickening with cystitis vs chronic outlet obstruction. CT chest negative for PE. Lung?parenchyma showed areas of? very mild atelectasis with no?strong findings for pneumonia. Initial Hgb 14.4 with only a slight drop to 13.7. Hes been seen by surgery and surgical intervention not warranted at this time. Abdominal binder has been ordered. Denies any abdominal trauma. On ASA 81mg daily but no other anticoagulants or antiplatelets. VS stable. Patient comfortable at this time.  ?  Review of Systems  ?????  ????As per HPI otherwise all systems reviewed and negative.?  Physical Exam  Vitals & Measurements  T:?36.6? ?C (Oral)? TMIN:?36.6? ?C (Oral)? TMAX:?36.8? ?C (Oral)? HR:?61(Peripheral)? RR:?20? BP:?143/84? SpO2:?95%? WT:?82?kg? BMI:?28.37?  ????GENERAL: awake, alert, oriented, in no distress, calm, cooperative  ????HEENT: PERRL, no scleral icterus, mucous membranes moist  ????NECK: no JVD, no bruits  ????LUNGS: clear bilateral, unlabored  ????HEART: rate regular, rhythm regular, no murmur, no edema  ????GI: soft, nontender, no distension, normal bowel sounds  ????MS: normal ROM, no obvious deformities  ????SKIN: warm, dry, intact  ????NEURO: cranial nerves 2-12 grossly intact, no obvious focal deficits  Assessment/Plan  ?  Left rectus sheath hematoma  - monitor HH and if remains stable can d/c home  -  Abdominal binder  - Surgery has cleared for diet and to restart ASA  - Patient can expect hematoma to resolve over the next few weeks  - Has an appt with Dr. Reyna on Monday  ?  Acute bronchitis  - tessalon perles prn  ?  HTN - stable  ?  ?????  ????DVT prophylaxis: Patient is ambulatory  ????Code status: Full  ????PCP:?KANWAL Reyna MD  ???????  ?  ?I, Karolina Martin NP, discussed this case with Dr.?M. Briscoe.  ?   I Dr KARRI Briscoe assumed care of this patient today at?14:18  ?  For this patient encounter, I reviewed the NP/PA documentation, treatment plan and medical decision making; and I had face-to-face time with this patient  a. History?: cough x 1 month. Abdominal pain. CT showed a small hematoma. Pt denies any injury or trauma.  Afebrile. No SOB, fever or chills.  b. Physical exam  Lungs clear  Heart RRR  Abdomen soft and non tender  c. Medical decision making  Pts labs and vitals reviewed  Feeling better after getting the pain meds  Added Robitussin with codeine as needed  No signs of acute infection  CT chest negative for consolidation  No surgical indication for the superficial abdominal hematoma  Observe overnight and dc in am if stable  ?   All diagnosis and differential diagnosis have been reviewed; assement and plan has been documented; I have personally reviewed the labs and test results that are presently available; I have reviewed the patients medication list; I have reviewed the consulting providers response and recommendations. I have reviewed or attempted to review medical records based upon their availability.  ?   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  ??Social History: ?No tobacco or illicit drug use.?Quit smoking 7 yrs ago ago smoking a PPD for 40 yrs. Drinks?2-3 glasses of wine daily.  ?  Medications  Inpatient  acetaminophen, 1000 mg= 2 tab(s), Oral, q6hr, PRN  morphine 4 mg/mL preservative-free intravenous solution, 4 mg= 1 mL, IV Push, q4hr, PRN  Norco 7.5 mg-325 mg oral tablet, 1 tab(s), Oral, q4hr, PRN  Zofran, 4 mg= 2 mL, IV Push, q4hr, PRN  Home  Amoxil 875 mg oral tablet, 875 mg= 1 tab(s), Oral, BID  aspirin 81 mg oral Delayed Release (EC) tablet, 81 mg= 1 tab(s), Oral, Daily  ATORVASTATIN 40MG TABLETS, 40 mg= 1 tab(s), Oral, qPM  Deltasone 20 mg oral tablet, 20 mg= 1 tab(s), Oral, BID  ESCITALOPRAM 10 MG TABLET, 10 mg= 1 tab(s), Oral, qPM  Flonase 50 mcg/inh nasal spray, 2 spray(s), Nasal, BID  METOPROLOL ER SUCCINATE 50MG TABS, 50 mg= 1 tab(s), Oral, qAM  RABEPRAZOLE DR 20MG TABLETS, 20 mg= 1 tab(s), Oral, qAM  Allergies  No Known Allergies  Immunizations  Vaccine Date Status   tetanus/diphtheria/pertussis, acel(Tdap) 06/30/2019 Given   Lab Results  Labs Last 24 Hours?  ?Chemistry? Hematology/Coagulation?   Sodium Lvl: 140 mmol/L (01/03/20 22:00:00) D-Dimer: 427 ng/ml FEU (01/03/20 22:00:00)   Potassium Lvl: 4.8 mmol/L (01/03/20 22:00:00) WBC:?12.9 x10(3)/mcL?High (01/04/20 07:41:00)   Chloride: 104 mmol/L (01/03/20 22:00:00) RBC:?4.09 x10(6)/mcL?Low (01/04/20 07:41:00)   CO2: 27 mmol/L (01/03/20 22:00:00) Hgb:?13.7 gm/dL?Low (01/04/20 07:41:00)   Calcium Lvl: 9.2 mg/dL (01/03/20 22:00:00) Hct:?41.5 %?Low (01/04/20 07:41:00)   Glucose Lvl:?120 mg/dL?High (01/03/20 22:00:00) Platelet: 199 x10(3)/mcL (01/04/20 07:41:00)   BUN:?4 mg/dL?Low (01/03/20 22:00:00) MCV:?101.5 fL?High (01/04/20 07:41:00)   Creatinine: 1.28 mg/dL (01/03/20 22:00:00) MCH:?33.5 pg?High (01/04/20 07:41:00)   eGFR-AA: >60 (01/03/20 22:00:00) MCHC: 33 gm/dL (01/04/20 07:41:00)    eGFR-FELISHA: 59 mL/min/1.73 m2 (01/03/20 22:00:00) RDW: 12.7 % (01/04/20 07:41:00)   Bili Total: 0.3 mg/dL (01/03/20 22:00:00) MPV: 10.7 fL (01/04/20 07:41:00)   Bili Direct: 0.1 mg/dL (01/03/20 22:00:00) Abs Neut:?10.55 x10(3)/mcL?High (01/04/20 07:41:00)   Bili Indirect: 0.2 mg/dL (01/03/20 22:00:00) Neutro Auto: 82 % (01/04/20 07:41:00)   AST: 24 unit/L (01/03/20 22:00:00) Lymph Auto: 12 % (01/04/20 07:41:00)   ALT: 37 unit/L (01/03/20 22:00:00) Mono Auto: 5 % (01/04/20 07:41:00)   Alk Phos: 88 unit/L (01/03/20 22:00:00) Eos Auto: 0 % (01/04/20 07:41:00)   Total Protein: 6.9 gm/dL (01/03/20 22:00:00) Abs Eos: 0 x10(3)/mcL (01/04/20 07:41:00)   Albumin Lvl: 3.9 gm/dL (01/03/20 22:00:00) Basophil Auto: 0 % (01/04/20 07:41:00)   Globulin: 3 gm/dL (01/03/20 22:00:00) Abs Neutro:?10.55 x10(3)/mcL?High (01/04/20 07:41:00)   A/G Ratio: 1.3 ratio (01/03/20 22:00:00) Abs Lymph: 1.6 x10(3)/mcL (01/04/20 07:41:00)   BNP: 51 pg/mL (01/03/20 22:00:00) Abs Mono: 0.6 x10(3)/mcL (01/04/20 07:41:00)   Lipase Lvl: 149 unit/L (01/03/20 22:00:00) Abs Baso: 0 x10(3)/mcL (01/04/20 07:41:00)   Troponin-I: <0.02 (01/03/20 22:00:00)    Diagnostic Results  ?? ? ? ??  * Final Report *  ?  Reason For Exam  Pulmonary Embolism  ?  Radiology Report  CT Angio Chest PE W Contrast  ?  REASON FOR EXAM: Cough with left chest pain  ?  TECHNIQUE: CT imaging of the chest after the administration of IV  contrast. Axial, coronal and sagittal reconstruction, including MIP  images, are reviewed. Dose length product is 1129 mGycm. Automatic  exposure control, adjustment of mA/kV or iterative reconstruction  technique was used to limit radiation dose.  ?  COMPARISON: No relevant comparison studies available at the time of  dictation.  ?  FINDINGS:?  ?  Diagnostic quality: Adequate  ?  Pulmonary embolism: None identified.  ?  Lung parenchyma: Assessment slightly limited due to motion. Areas of  very mild atelectasis. No strong findings for  pneumonia.  ?  Pleural effusion: None.  ?  Adenopathy: No pathologically enlarged lymph node identified.  ?  Heart and great vessels: Mild enlargement of the heart. Mild aortic  calcifications. No significant pericardial fluid.  ?  Chest wall/axilla: Postsurgical changes involving the left anterior  chest wall.  ?  Bones: Few chronic left rib deformities.  ?  IMPRESSION: No pulmonary embolism identified.  ?  There is no significant discrepancy between my interpretation and the  preliminary radiology report.  ?  ?  Signature Line  Electronically Signed By: Vaibhav Flores MD  Date/Time Signed: 01/04/2020 08:32  ?? ? ? ??  * Final Report *  ?  Reason For Exam  Abdominal Pain  ?  Radiology Report  CT Abdomen and Pelvis W Contrast  ?  REASON FOR STUDY: Abdominal Pain?  ?  TECHNIQUE: CT imaging was performed of the abdomen and pelvis after  the administration of intravenous contrast. Dose length product is  1129 mGycm. Automatic exposure control, adjustment of mA/kV or  iterative reconstruction technique was used to limit radiation dose.  ?  COMPARISON: No relevant comparison studies available at the time of  dictation.?  ?  FINDINGS:?  ?  Liver: Normal.?  ?  Gallbladder and biliary tree: Several gallstones. No intra or  extrahepatic biliary ductal dilation.?  ?  Pancreas: Normal.  ?  Spleen: Normal.  ?  Adrenals: Normal.  ?  Kidneys and ureters: Symmetric renal enhancement. No hydronephrosis.  ?  Bladder: Underdistended with generalized wall thickening.  ?  Reproductive organs: Mildly enlarged prostate.  ?  Stomach/bowel: No evidence of bowel obstruction. Normal appendix.  Areas of colonic diverticulosis with no appreciable pericolonic  inflammation.?  ?  Lymph nodes: No pathologically enlarged lymph node identified.  ?  Peritoneum: No ascites or free air. No fluid collection.  ?  Vasculature: Mild calcifications within the abdominal aorta and iliac  arteries. No abdominal aortic aneurysm. The SMV, splenic vein and  main  portal vein are patent.?  ?  Abdominal wall: Hematoma within the left rectus sheath measures about  8 cm in transverse diameter.  ?  Bones: Postsurgical changes in the lower lumbar spine and pelvis. ? ?  ?  IMPRESSION:?  1. Left rectus sheath hematoma measuring about 8 cm in transverse  diameter. Linear hyperdense area within the hematoma may reflect a  site of active bleeding.  2. Bladder wall thickening may relate to cystitis or a component of  chronic outlet obstruction.  3. Cholelithiasis.  ?  The findings for this exam was discussed with Glenn Gant NP by the  [facility] staff radiologist at 0055 hours on 1/4/2020.  ?  There is no significant discrepancy between my interpretation and the  preliminary radiology report.  ?  ?  Signature Line  Electronically Signed By: Mark HIGHTOWER, Vaibhav ELIZONDO  Date/Time Signed: 01/04/2020 08:21  ?

## 2022-05-04 NOTE — HISTORICAL [facility]
This is a historical note converted from [employer]. Formatting and pictures may have been removed.  Please reference [employer] for original formatting and attached multimedia. Chief Complaint  NEW PATIENT HERE FOR LEFT HIP/BACK PAIN REFERRED BY DR SAVAGE. PATIENT STATES THE PAIN IS IN THE BACK OF HIS HIP AND SHOOTS TO HIS BACK  History of Present Illness  Left hip pain?in the groin and lateral aspect of the left hip?with certain?rotational motions.? No weightbearing pain.? No numbness or tingling.? Patient underwent an L4-S1?lumbar spinal fusion and instrumentation over 10 years ago.? No bowel or bladder dysfunction.  Review of Systems  Systemic: No fever, no chills, and no recent weight change.  Head: No headache - frequent.  Eyes: No vision problems.  Otolarnygeal: No hearing loss, no earache, no epistaxis, no hoarseness, and no tooth pain. Gums normal.  Cardiovascular: No chest pain or discomfort and no palpitations.  Pulmonary: No pulmonary symptoms - difficulty sleeping, no dyspnea, and cough not worse in the morning.  Gastrointestinal: Appetite not decreased. No dysphagia and no constant eructation. No nausea, no vomiting, no abdominal pain, no hematochezia, and no loose/mushy stools - frequent. No constipation - frequent.  Genitourinary: No genitourinary symptoms - Getting up every night to urinate and no increase in urinary frequency. No urinary hesitancy. No urinary loss of control - difficulty stopping urination and no burning sensation during urination.  Musculoskeletal: No calf muscle cramps and no localized soft tissue swelling of the ankle.  Neurological: No fainting and no convulsions.  Psychological: Not feeling nervous tension, not feeling nervous from exhaustion, and no depression.  Skin: No rash. Previous history of no ulcers.  Physical Exam  Vitals & Measurements  BP:?182/82?  HT:?168?cm? HT:?168?cm? WT:?79.3?kg? WT:?79.3?kg? BMI:?28.1?  Lumbar exam:  No swelling, no redness, no increased heat  No  atrophy  No tenderness  Well-healed incision?excellent?no evidence of infection  No tenderness with right or left lateral bend  No tenderness with forward flexion or?extension  Normal reflexes  Normal gait  2+ pulses  ?  General Appearance:  Well developed. In no acute distress. Awake, alert, and oriented to person, place, time, and situation. Antalgic gait.  ?  Pulses:  Arterial Pulses: Posterior tibialis pulses were normal. Dorsalis pedis pulses were normal.  ?  Musculoskeletal System:  Left Hip:  General: No erythema of the hip. No swelling, no increased heat. Skin with no wounds or lesions. Mild lateral tenderness over trochanteric bursa. Groin tenderness with internal and external rotation as well as weight-bearing.  ?  Left Hip:  Hip Motion: Value  Passive internal rotation _30 degrees  Passive external rotation 60_ degrees  Passive flexion _120 degrees  Passive abduction 45_ degrees  Passive adduction _20 degrees  Flexion contracture 0_ degrees  ? Pain was elicited by active internal rotation with the hip extended. ? Pain was elicited by active internal rotation with the hip flexed. ? No swelling. ? No induration. ? Greater trochanter was tender on palpation. ? Hip was tender on palpation. pain was elicited by active external rotation with the hip extended and flexed. ? Hip was not dislocated. ? Hip was not subluxed. ? Hip did not show laxity. ? No instability was noted. The hip was tender with ambulation.  Left Lower Leg:  Mild quadriceps atrophy.  ?  Neurological:  Sensation: intact distally in foot  Motor (Strength): Mild weakness of the right lower extremity was observed.  Gait And Stance: ? A Left sided antalgic gait was observed.  ?  Skin:  ? Normal. ? No ulcer was seen on the feet.  ?  Left Hip xrays: _Very mild degenerative changes left hip joint  ?  ?  Assessment/Plan  1.?Osteoarthritis of left hip  ? Home exercises for stretching and strengthening  NSAIDs over-the-counter?as  needed  Ordered:  Office/Outpatient Visit Level 2 New [id number] PC, Osteoarthritis of left hip  Lumbar degenerative disc disease, [facility], 07/11/18 10:48:00 CDT  ?  2.?Lumbar degenerative disc disease  Ordered:  Office/Outpatient Visit Level 2 New [id number] PC, Osteoarthritis of left hip  Lumbar degenerative disc disease, [facility], 07/11/18 10:48:00 CDT  ?  Low back pain  ?   Problem List/Past Medical History  Ongoing  Acid reflux  Arthritis  Cancer of oropharynx  Concussion  Hypercholesterolemia  Lumbar degenerative disc disease  Osteoarthritis of left hip  Historical  can lie flat  can walk 2 blocks briskly w/o CP/SOB  Cholelithiasis  Wears glasses  Procedure/Surgical History  Dilation and catheterization of salivary duct, with or without injection. (06/25/2014), Other operations on salivary gland or duct (06/25/2014), Probing of salivary duct (06/25/2014), Sialendoscopy (Right) (06/25/2014), Unlisted procedure, salivary glands or ducts. (06/25/2014), colonscopy, crainotomy for subdural hematoma d/t accident, h/o back surgery x2, Hernia repair, partial reml liver d/t injury, repair ruptured aorta d/t injury.  Medications  aspirin 81 mg oral Delayed Release (EC) tablet, 81 mg= 1 tab(s), Oral, Daily  ATORVASTATIN 20 MG TABLET, 20 mg= 1 tab(s), Oral, Daily  CIALIS 20MG TABLETS,? ?Not taking  Dexilant 60 mg oral delayed release capsule, 60 mg= 1 cap(s), Oral, Daily,? ?Not taking  EPINEPHRINE 0.3 MG AUTO-INJECT,? ?Not taking  ESCITALOPRAM 10 MG TABLET, 10 mg= 1 tab(s), Oral, qPM  METOPROLOL ER SUCCINATE 50MG TABS, 50 mg= 1 tab(s), Oral, qAM  Multivitamins and Minerals oral tablet, 1, Oral, Daily  PREDNISONE 10 MG TABLET,? ?Not taking  RABEPRAZOLE DR 20MG TABLETS, 20 mg= 1 tab(s), Oral, qAM  TRIAMTERENE 37.5MG/ HCTZ 25MG CAPS, 1 cap(s), Oral, Daily,? ?Not taking  Zofran ODT 4 mg oral tablet, disintegrating, 4 mg= 1 tab(s), Oral, TID, PRN,? ?Not taking  Allergies  No Known Allergies  Social  History  Alcohol  Current, Wine, 3-5 times per week, 06/18/2014  Employment/School  Employed, 06/18/2014  Substance Abuse - Denies Substance Abuse, 06/18/2014  Never, 06/02/2018  Tobacco  Former smoker, Cigars, 06/18/2014  Family History  Family history is unknown  Health Maintenance  Health Maintenance  ???Pending?(in the next year)  ??? ??OverDue  ??? ? ? ?Aspirin Therapy for CVD Prevention due??06/18/15??and every 1??year(s)  ??? ??Due?  ??? ? ? ?Abdominal Aortic Aneurysm Screening due??07/11/18??and every 100??year(s)  ??? ? ? ?Alcohol Misuse Screening due??07/11/18??and every 1??year(s)  ??? ? ? ?Colorectal Screening due??07/11/18??Variable frequency  ??? ? ? ?Functional Assessment due??07/11/18??and every 1??year(s)  ??? ? ? ?Pneumococcal Vaccine due??07/11/18??and every 100??year(s)  ??? ? ? ?Tetanus Vaccine due??07/11/18??and every 10??year(s)  ??? ? ? ?Zoster Vaccine due??07/11/18??and every 100??year(s)  ??? ??Due In Future?  ??? ? ? ?Influenza Vaccine not due until??10/02/18??and every 1??year(s)  ??? ? ? ?Lipid Screening not due until??02/12/19??and every 1??year(s)  ??? ? ? ?Fall Risk Assessment not due until??06/02/19??and every 1??year(s)  ???Satisfied?(in the past 1 year)  ??? ??Satisfied?  ??? ? ? ?Blood Pressure Screening on??07/11/18.??Satisfied by Shabnam Lake MA  ??? ? ? ?Body Mass Index Check on??07/11/18.??Satisfied by Shabnam Lake MA  ??? ? ? ?Depression Screening on??07/11/18.??Satisfied by Shabnam Lake MA  ??? ? ? ?Diabetes Screening on??06/02/18.??Satisfied by Jessica Amos  ??? ? ? ?Fall Risk Assessment on??06/02/18.??Satisfied by Colleen Quinones RN  ??? ? ? ?Lipid Screening on??02/12/18.??Satisfied by Mayra Ibarra  ??? ? ? ?Obesity Screening on??07/11/18.??Satisfied by Shabnam Lake MA  ??? ? ? ?Tobacco Use Screening on??07/11/18.??Satisfied by Shabnam Lake MA  ?  ?

## 2022-05-04 NOTE — HISTORICAL [facility] C[facility]ER
This is a historical note converted from [place]. Formatting and pictures may have been removed.  Please reference [employer] for original formatting and attached multimedia. Chief Complaint  C/O cough x 1.5 months, C/O rib pain with coughing and movement.  Reason for Consultation  rectus sheath hematoma  History of Present Illness  69M with pmh of HTN, HLD, on baby ASA, chronic back pain presents after two weeks of cough that caused a sudden left sided upper abdomen chest pain. The patient states he had a cough approximately one moth ago treated with ABX, but returned the week of christmas. He had significant coughing spells to the point of losing his breath that eventually caused a left sided sharp burning pain. The patient was evaluated in the ED with normal VS, laboratory work wnl, and CT scan showing rectus sheath hematoma.  Review of Systems  positive for cough, positive for left chest pain  Negative unless otherwise stated  Physical Exam  Vitals & Measurements  T:?36.6? ?C (Oral)? HR:?57(Peripheral)? RR:?20? BP:?131/77? SpO2:?95%? WT:?82?kg?  Gen: NAD  CV: peripheral pulses intact  Pulm: normal effort no accessory muscle use  Abd: soft, Left upper abdomen with firm, tender mass, approx 8 by 6  MSK: GLOVER  Neuro: GCS 15  Assessment/Plan  A: 69M with HTN, HLD, on baby asa presents with cough and rectus sheath hematoma  ?  P:  medicine eval  repeat labs  if patient drops his hgb recommend IR evaluation  surgery will follow, operative management typically reserved for hemodynamic instability   Problem List/Past Medical History  Ongoing  Acid reflux  Acute gastroenteritis  Arthritis  Cancer of oropharynx  Degenerative spondylolisthesis  Herniated nucleus pulposus, L2-3  Hypercholesterolemia  Hypertension  Lumbar degenerative disc disease  Lumbar radiculopathy  Osteoarthritis of left hip  Restless legs syndrome  S/P lumbar fusion  Trochanteric bursitis, left hip  Historical  can lie flat  can walk 2 blocks briskly w/o  CP/SOB  Cholelithiasis  Concussion  Wears glasses  Procedure/Surgical History  Repair Right Lower Leg Subcutaneous Tissue and Fascia, Open Approach (06/30/2019)  Simple repair of superficial wounds of scalp, neck, axillae, external genitalia, trunk and/or extremities (including hands and feet); 2.6 cm to 7.5 cm (06/30/2019)  Excision of Lumbar Vertebral Disc, Open Approach (12/18/2018)  Laminectomy Lumbar MIS (Bilateral) (12/18/2018)  Laminotomy (hemilaminectomy), with decompression of nerve root(s), including partial facetectomy, foraminotomy and/or excision of herniated intervertebral disc; 1 interspace, lumbar (12/18/2018)  Microdisecectomy Lumbar MIS (None) (12/18/2018)  Release Lumbar Nerve, Open Approach (12/18/2018)  Dilation and catheterization of salivary duct, with or without injection. (06/25/2014)  Other operations on salivary gland or duct (06/25/2014)  Probing of salivary duct (06/25/2014)  Sialendoscopy (Right) (06/25/2014)  Unlisted procedure, salivary glands or ducts. (06/25/2014)  colonscopy  crainotomy for subdural hematoma d/t accident  h/o back surgery x2  Hernia repair  partial reml liver d/t injury  repair ruptured aorta d/t injury   Medications  Inpatient  acetaminophen, 1000 mg= 2 tab(s), Oral, q6hr, PRN  Zofran, 4 mg= 2 mL, IV Push, q4hr, PRN  Home  aspirin 81 mg oral Delayed Release (EC) tablet, 81 mg= 1 tab(s), Oral, Daily  ATORVASTATIN 40MG TABLETS, 40 mg= 1 tab(s), Oral, qPM  Bentyl 10 mg oral capsule, 10 mg= 1 cap(s), Oral, QID, PRN,? ?Not taking  CYCLOBENZAPRINE 10MG TABLETS, 10 mg= 1 tab(s), Oral, QID  ESCITALOPRAM 10 MG TABLET, 10 mg= 1 tab(s), Oral, qPM  Flonase 50 mcg/inh nasal spray, 2 spray(s), Nasal, BID  GABAPENTIN 600 MG TABLET, 600 mg= 1 tab(s), Oral, TID  METOPROLOL ER SUCCINATE 50MG TABS, 50 mg= 1 tab(s), Oral, qAM  Multivitamins and Minerals oral tablet, 1, Oral, Daily  OXYCODONE-ACETAMINOPHEN 5-325, Oral, QID  RABEPRAZOLE DR 20MG TABLETS, 20 mg= 1 tab(s), Oral,  qAM  Zofran ODT 4 mg oral tablet, disintegrating, 4 mg= 1 tab(s), Oral, TID, PRN,? ?Not taking  Allergies  No Known Allergies  Social History  Abuse/Neglect  No, 01/03/2020  Alcohol  Current, Wine, 3-5 times per week, 06/18/2014  Employment/School  Employed, 06/18/2014  Substance Use - Denies Substance Abuse, 06/18/2014  Never, 06/02/2018  Tobacco  Former smoker, quit more than 30 days ago, N/A, 01/03/2020  Family History  Hypertension.: Father.  Immunizations  Vaccine Date Status   tetanus/diphtheria/pertussis, acel(Tdap) 06/30/2019 Given       Agree with above assessment and plan. Supportive care. Avoid anticoagulation. Trend H&H. Abdominal binder. No surgical or IR intervention required. Hematoma will resorb over coming weeks/months.

## 2022-05-26 DIAGNOSIS — F32.A DEPRESSION, UNSPECIFIED DEPRESSION TYPE: Primary | ICD-10-CM

## 2022-05-26 RX ORDER — ESCITALOPRAM OXALATE 10 MG/1
10 TABLET ORAL DAILY
Qty: 90 TABLET | Refills: 3 | Status: SHIPPED | OUTPATIENT
Start: 2022-05-26 | End: 2023-08-10

## 2022-07-20 ENCOUNTER — PATIENT OUTREACH (OUTPATIENT)
Dept: ADMINISTRATIVE | Facility: HOSPITAL | Age: 72
End: 2022-07-20
Payer: MEDICARE

## 2022-07-25 ENCOUNTER — TELEPHONE (OUTPATIENT)
Dept: INTERNAL MEDICINE | Facility: CLINIC | Age: 72
End: 2022-07-25
Payer: MEDICARE

## 2022-07-25 DIAGNOSIS — I10 HYPERTENSION, UNSPECIFIED TYPE: Primary | ICD-10-CM

## 2022-07-25 RX ORDER — VALSARTAN 320 MG/1
320 TABLET ORAL DAILY
COMMUNITY
Start: 2022-05-31 | End: 2022-07-25 | Stop reason: SDUPTHER

## 2022-07-25 RX ORDER — VALSARTAN 320 MG/1
320 TABLET ORAL DAILY
Qty: 90 TABLET | Refills: 1 | Status: SHIPPED | OUTPATIENT
Start: 2022-07-25 | End: 2023-03-20

## 2022-07-25 NOTE — TELEPHONE ENCOUNTER
----- Message from Ling Ng sent at 7/25/2022  2:22 PM CDT -----  Regarding: refill  Pt needs valsartan 320 mg once a day refilled at [facility]. His callback number is [phone]

## 2022-08-02 RX ORDER — METOPROLOL SUCCINATE 50 MG/1
TABLET, EXTENDED RELEASE ORAL
Qty: 90 TABLET | Refills: 2 | Status: SHIPPED | OUTPATIENT
Start: 2022-08-02 | End: 2023-04-25

## 2022-08-02 RX ORDER — ATORVASTATIN CALCIUM 40 MG/1
TABLET, FILM COATED ORAL
Qty: 90 TABLET | Refills: 2 | Status: SHIPPED | OUTPATIENT
Start: 2022-08-02 | End: 2023-04-25

## 2022-08-03 RX ORDER — HYDROXYZINE HYDROCHLORIDE 25 MG/1
TABLET, FILM COATED ORAL
Qty: 90 TABLET | Refills: 1 | Status: SHIPPED | OUTPATIENT
Start: 2022-08-03 | End: 2023-01-25

## 2022-08-04 ENCOUNTER — LAB VISIT (OUTPATIENT)
Dept: LAB | Facility: HOSPITAL | Age: 72
End: 2022-08-04
Attending: INTERNAL MEDICINE
Payer: MEDICARE

## 2022-08-04 DIAGNOSIS — K21.9 GASTROESOPHAGEAL REFLUX DISEASE, UNSPECIFIED WHETHER ESOPHAGITIS PRESENT: ICD-10-CM

## 2022-08-04 DIAGNOSIS — Z00.00 ROUTINE GENERAL MEDICAL EXAMINATION AT A HEALTH CARE FACILITY: Primary | ICD-10-CM

## 2022-08-04 DIAGNOSIS — E78.5 HYPERLIPIDEMIA, UNSPECIFIED HYPERLIPIDEMIA TYPE: ICD-10-CM

## 2022-08-04 DIAGNOSIS — Z12.5 SPECIAL SCREENING FOR MALIGNANT NEOPLASM OF PROSTATE: ICD-10-CM

## 2022-08-04 DIAGNOSIS — I10 ESSENTIAL HYPERTENSION, MALIGNANT: ICD-10-CM

## 2022-08-04 LAB
ALBUMIN SERPL-MCNC: 4.1 GM/DL (ref 3.4–4.8)
ALBUMIN/GLOB SERPL: 1.9 RATIO (ref 1.1–2)
ALP SERPL-CCNC: 76 UNIT/L (ref 40–150)
ALT SERPL-CCNC: 21 UNIT/L (ref 0–55)
APPEARANCE UR: CLEAR
AST SERPL-CCNC: 17 UNIT/L (ref 5–34)
BACTERIA #/AREA URNS AUTO: NORMAL /HPF
BASOPHILS # BLD AUTO: 0.04 X10(3)/MCL (ref 0–0.2)
BASOPHILS NFR BLD AUTO: 1.1 %
BILIRUB UR QL STRIP.AUTO: NEGATIVE MG/DL
BILIRUBIN DIRECT+TOT PNL SERPL-MCNC: 0.6 MG/DL
BUN SERPL-MCNC: 13.5 MG/DL (ref 8.4–25.7)
CALCIUM SERPL-MCNC: 9.2 MG/DL (ref 8.8–10)
CHLORIDE SERPL-SCNC: 105 MMOL/L (ref 98–107)
CHOLEST SERPL-MCNC: 140 MG/DL
CHOLEST/HDLC SERPL: 3 {RATIO} (ref 0–5)
CO2 SERPL-SCNC: 32 MMOL/L (ref 23–31)
COLOR UR AUTO: YELLOW
CREAT SERPL-MCNC: 0.96 MG/DL (ref 0.73–1.18)
EOSINOPHIL # BLD AUTO: 0.18 X10(3)/MCL (ref 0–0.9)
EOSINOPHIL NFR BLD AUTO: 5 %
ERYTHROCYTE [DISTWIDTH] IN BLOOD BY AUTOMATED COUNT: 13 % (ref 11.5–17)
GLOBULIN SER-MCNC: 2.2 GM/DL (ref 2.4–3.5)
GLUCOSE SERPL-MCNC: 98 MG/DL (ref 82–115)
GLUCOSE UR QL STRIP.AUTO: NEGATIVE MG/DL
HCT VFR BLD AUTO: 43.6 % (ref 42–52)
HDLC SERPL-MCNC: 51 MG/DL (ref 35–60)
HGB BLD-MCNC: 14.1 GM/DL (ref 14–18)
IMM GRANULOCYTES # BLD AUTO: 0.02 X10(3)/MCL (ref 0–0.04)
IMM GRANULOCYTES NFR BLD AUTO: 0.6 %
KETONES UR QL STRIP.AUTO: ABNORMAL MG/DL
LDLC SERPL CALC-MCNC: 61 MG/DL (ref 50–140)
LEUKOCYTE ESTERASE UR QL STRIP.AUTO: NEGATIVE UNIT/L
LYMPHOCYTES # BLD AUTO: 1.21 X10(3)/MCL (ref 0.6–4.6)
LYMPHOCYTES NFR BLD AUTO: 33.8 %
MCH RBC QN AUTO: 32.3 PG (ref 27–31)
MCHC RBC AUTO-ENTMCNC: 32.3 MG/DL (ref 33–36)
MCV RBC AUTO: 99.8 FL (ref 80–94)
MONOCYTES # BLD AUTO: 0.46 X10(3)/MCL (ref 0.1–1.3)
MONOCYTES NFR BLD AUTO: 12.8 %
NEUTROPHILS # BLD AUTO: 1.7 X10(3)/MCL (ref 2.1–9.2)
NEUTROPHILS NFR BLD AUTO: 46.7 %
NITRITE UR QL STRIP.AUTO: NEGATIVE
NRBC BLD AUTO-RTO: 0 %
PH UR STRIP.AUTO: 5 [PH]
PLATELET # BLD AUTO: 184 X10(3)/MCL (ref 130–400)
PMV BLD AUTO: 10.8 FL (ref 7.4–10.4)
POTASSIUM SERPL-SCNC: 4.6 MMOL/L (ref 3.5–5.1)
PROT SERPL-MCNC: 6.3 GM/DL (ref 5.8–7.6)
PROT UR QL STRIP.AUTO: NEGATIVE MG/DL
PSA SERPL-MCNC: 0.96 NG/ML
RBC # BLD AUTO: 4.37 X10(6)/MCL (ref 4.7–6.1)
RBC #/AREA URNS AUTO: <5 /HPF
RBC UR QL AUTO: NEGATIVE UNIT/L
SODIUM SERPL-SCNC: 143 MMOL/L (ref 136–145)
SP GR UR STRIP.AUTO: 1.02 (ref 1–1.03)
SQUAMOUS #/AREA URNS AUTO: <5 /HPF
TRIGL SERPL-MCNC: 139 MG/DL (ref 34–140)
UROBILINOGEN UR STRIP-ACNC: 1 MG/DL
VLDLC SERPL CALC-MCNC: 28 MG/DL
WBC # SPEC AUTO: 3.6 X10(3)/MCL (ref 4.5–11.5)
WBC #/AREA URNS AUTO: <5 /HPF

## 2022-08-04 PROCEDURE — 80053 COMPREHEN METABOLIC PANEL: CPT

## 2022-08-04 PROCEDURE — 36415 COLL VENOUS BLD VENIPUNCTURE: CPT

## 2022-08-04 PROCEDURE — 80061 LIPID PANEL: CPT

## 2022-08-04 PROCEDURE — 85025 COMPLETE CBC W/AUTO DIFF WBC: CPT

## 2022-08-04 PROCEDURE — 81001 URINALYSIS AUTO W/SCOPE: CPT

## 2022-08-04 PROCEDURE — 84153 ASSAY OF PSA TOTAL: CPT

## 2022-08-08 RX ORDER — ASPIRIN 81 MG/1
81 TABLET ORAL DAILY
COMMUNITY

## 2022-08-08 RX ORDER — RABEPRAZOLE SODIUM 20 MG/1
20 TABLET, DELAYED RELEASE ORAL DAILY
COMMUNITY
Start: 2022-08-03 | End: 2023-01-24

## 2022-08-10 ENCOUNTER — TELEPHONE (OUTPATIENT)
Dept: INTERNAL MEDICINE | Facility: CLINIC | Age: 72
End: 2022-08-10

## 2022-08-10 ENCOUNTER — OFFICE VISIT (OUTPATIENT)
Dept: INTERNAL MEDICINE | Facility: CLINIC | Age: 72
End: 2022-08-10
Payer: MEDICARE

## 2022-08-10 ENCOUNTER — HOSPITAL ENCOUNTER (OUTPATIENT)
Dept: RADIOLOGY | Facility: HOSPITAL | Age: 72
Discharge: HOME | End: 2022-08-10
Attending: INTERNAL MEDICINE
Payer: MEDICARE

## 2022-08-10 VITALS
SYSTOLIC BLOOD PRESSURE: 120 MMHG | WEIGHT: 187.63 LBS | HEART RATE: 68 BPM | HEIGHT: 67 IN | DIASTOLIC BLOOD PRESSURE: 78 MMHG | RESPIRATION RATE: 18 BRPM | BODY MASS INDEX: 29.45 KG/M2

## 2022-08-10 DIAGNOSIS — K21.9 GASTROESOPHAGEAL REFLUX DISEASE, UNSPECIFIED WHETHER ESOPHAGITIS PRESENT: ICD-10-CM

## 2022-08-10 DIAGNOSIS — E78.5 HYPERLIPIDEMIA, UNSPECIFIED HYPERLIPIDEMIA TYPE: ICD-10-CM

## 2022-08-10 DIAGNOSIS — C14.0 THROAT CANCER: ICD-10-CM

## 2022-08-10 DIAGNOSIS — I10 ESSENTIAL (PRIMARY) HYPERTENSION: ICD-10-CM

## 2022-08-10 DIAGNOSIS — I77.9 CAROTID ARTERY DISEASE, UNSPECIFIED LATERALITY, UNSPECIFIED TYPE: ICD-10-CM

## 2022-08-10 DIAGNOSIS — R06.02 SHORT OF BREATH ON EXERTION: ICD-10-CM

## 2022-08-10 DIAGNOSIS — Z00.00 WELLNESS EXAMINATION: Primary | ICD-10-CM

## 2022-08-10 DIAGNOSIS — K76.0 FATTY LIVER: ICD-10-CM

## 2022-08-10 DIAGNOSIS — R13.10 DYSPHAGIA, UNSPECIFIED TYPE: ICD-10-CM

## 2022-08-10 PROCEDURE — G0439 PPPS, SUBSEQ VISIT: HCPCS | Mod: ,,, | Performed by: INTERNAL MEDICINE

## 2022-08-10 PROCEDURE — 71046 X-RAY EXAM CHEST 2 VIEWS: CPT | Mod: TC

## 2022-08-10 PROCEDURE — G0439 PR MEDICARE ANNUAL WELLNESS SUBSEQUENT VISIT: ICD-10-PCS | Mod: ,,, | Performed by: INTERNAL MEDICINE

## 2022-08-10 NOTE — PROGRESS NOTES
Brice Reyna MD        PATIENT NAME: Reddy Thapa  : 1950  DATE: 8/10/22  MRN: [id number]      Billing Provider: Brice Reyna MD  Level of Service: [place] MEDICARE ANNUAL WELLNESS SUBSEQUENT VISIT  Patient PCP Information     Provider PCP Type    Brice Reyna MD General          Reason for Visit / Chief Complaint: Medicare AWV (Wellness, complaints anxiety and stress)       Update PCP  Update Chief Complaint         History of Present Illness / Problem Focused Workflow     Reddy Thapa presents to the clinic with Medicare AWV (Wellness, complaints anxiety and stress)     Reddy is here for his annual wellness exam  His main complaint is when he eats he feels is fullness I the food will go down spine going on for some time  He has chronic reflux disease  He also reports shortness of breath feeling in the morning when he gets up  He has reported this is cardiologist.      Review of Systems   Review of Systems   Respiratory: Positive for shortness of breath.    Gastrointestinal: Positive for reflux.        Medical / Social / Family History     Past Medical History:   Diagnosis Date    Essential (primary) hypertension     Fatty liver        Past Surgical History:   Procedure Laterality Date    BACK SURGERY      fusionLl3 and L4 cartliage    LAPAROSCOPIC REPAIR OF INGUINAL HERNIA Left     liver discection      ruptured aorta         Social History  Mr. Thapa  reports that he has never smoked. He has never used smokeless tobacco. He reports current alcohol use of about 3.0 standard drinks of alcohol per week. He reports that he does not use drugs.    Family History  Mr.'s Thapa  family history includes Alzheimer's disease in his father; COPD in his mother; Emphysema in his mother; Hyperlipidemia in his brother and sister.    Medications and Allergies     Medications  Outpatient Medications Marked as Taking for the 8/10/22 encounter (Office Visit) with Brice Reyna  MD   Medication Sig Dispense Refill    aspirin (ECOTRIN) 81 MG EC tablet Take 81 mg by mouth once daily.      atorvastatin (LIPITOR) 40 MG tablet TAKE 1 TABLET BY MOUTH EVERY DAY 90 tablet 2    EScitalopram oxalate (LEXAPRO) 10 MG tablet Take 1 tablet (10 mg total) by mouth once daily. 90 tablet 3    hydrOXYzine HCL (ATARAX) 25 MG tablet TAKE 1 TABLET BY MOUTH AT BEDTIME 90 tablet 1    metoprolol succinate (TOPROL-XL) 50 MG 24 hr tablet TAKE 1 TABLET BY MOUTH DAILY 90 tablet 2    RABEprazole (ACIPHEX) 20 mg tablet Take 20 mg by mouth once daily at 6am.      valsartan (DIOVAN) 320 MG tablet Take 1 tablet (320 mg total) by mouth once daily. 90 tablet 1       Allergies  Review of patient's allergies indicates:  No Known Allergies    Physical Examination     Vitals:    08/10/22 0857   BP: 120/78   Pulse: 68   Resp: 18     Physical Exam     Assessment and Plan (including Health Maintenance)      Problem List  Smart Sets  Document Outside HM   :    Plan:   Wellness examination    Essential (primary) hypertension    Fatty liver    Hyperlipidemia, unspecified hyperlipidemia type    Gastroesophageal reflux disease, unspecified whether esophagitis present    Carotid artery disease, unspecified laterality, unspecified type    Short of breath on exertion    Dysphagia, unspecified type    Throat cancer       Discussion of all lab reports which are stable  Referral to GI for EGD and dysphagia  Chest x-ray and BNP to evaluate shortness of breath  Revisit 1 year wellness      Health Maintenance Due   Topic Date Due    Hepatitis C Screening  Never done    Pneumococcal Vaccines (Age 65+) (2 - PPSV23 or PCV20) 09/22/2021       Problem List Items Addressed This Visit        Cardiac/Vascular    Essential (primary) hypertension    Hyperlipidemia    Carotid artery disease       Oncology    RESOLVED: Throat cancer       GI    Fatty liver    GERD (gastroesophageal reflux disease)      Other Visit Diagnoses     Wellness examination     -  Primary    Short of breath on exertion        Dysphagia, unspecified type              Health Maintenance Topics with due status: Not Due       Topic Last Completion Date    Colorectal Cancer Screening 06/30/2020    TETANUS VACCINE 04/29/2021    Influenza Vaccine 09/29/2021    High Dose Statin 08/02/2022    Lipid Panel 08/04/2022       No future appointments.         Signature:  Brice Sawyer MD  [facility]  [facility]  [place] [id number]    Date of encounter: 8/10/22

## 2022-08-30 DIAGNOSIS — K20.90 CHRONIC ESOPHAGITIS: Primary | ICD-10-CM

## 2022-08-30 DIAGNOSIS — K29.60 EROSIVE GASTRITIS: ICD-10-CM

## 2023-01-17 ENCOUNTER — PATIENT OUTREACH (OUTPATIENT)
Dept: ADMINISTRATIVE | Facility: HOSPITAL | Age: 73
End: 2023-01-17
Payer: MEDICARE

## 2023-01-17 NOTE — PROGRESS NOTES
[facility] CMS chart audits/COLON CANCER SCREENING. Chart review completed for HM test overdue (mammograms, Colonoscopies, pap smears, DM labs, and/or EYE EXAMs)      Care Everywhere and media, updates requested and reviewed.    Colonoscopy 6/30/2020, report in media.

## 2023-03-17 ENCOUNTER — OFFICE VISIT (OUTPATIENT)
Dept: URGENT CARE | Facility: CLINIC | Age: 73
End: 2023-03-17
Payer: MEDICARE

## 2023-03-17 VITALS
OXYGEN SATURATION: 99 % | HEART RATE: 53 BPM | TEMPERATURE: 98 F | RESPIRATION RATE: 18 BRPM | HEIGHT: 66 IN | DIASTOLIC BLOOD PRESSURE: 90 MMHG | WEIGHT: 177 LBS | BODY MASS INDEX: 28.45 KG/M2 | SYSTOLIC BLOOD PRESSURE: 144 MMHG

## 2023-03-17 DIAGNOSIS — R55 NEAR SYNCOPE: Primary | ICD-10-CM

## 2023-03-17 LAB
ALBUMIN SERPL-MCNC: 4.4 G/DL (ref 3.4–4.8)
ALBUMIN/GLOB SERPL: 1.8 RATIO (ref 1.1–2)
ALP SERPL-CCNC: 75 UNIT/L (ref 40–150)
ALT SERPL-CCNC: 26 UNIT/L (ref 0–55)
AST SERPL-CCNC: 22 UNIT/L (ref 5–34)
BASOPHILS # BLD AUTO: 0.04 X10(3)/MCL (ref 0–0.2)
BASOPHILS NFR BLD AUTO: 0.6 %
BILIRUBIN DIRECT+TOT PNL SERPL-MCNC: 0.4 MG/DL
BUN SERPL-MCNC: 17.7 MG/DL (ref 8.4–25.7)
CALCIUM SERPL-MCNC: 9.7 MG/DL (ref 8.8–10)
CHLORIDE SERPL-SCNC: 105 MMOL/L (ref 98–107)
CO2 SERPL-SCNC: 26 MMOL/L (ref 23–31)
CREAT SERPL-MCNC: 1.24 MG/DL (ref 0.73–1.18)
EOSINOPHIL # BLD AUTO: 0.18 X10(3)/MCL (ref 0–0.9)
EOSINOPHIL NFR BLD AUTO: 2.9 %
ERYTHROCYTE [DISTWIDTH] IN BLOOD BY AUTOMATED COUNT: 11.9 % (ref 11.5–17)
GFR SERPLBLD CREATININE-BSD FMLA CKD-EPI: >60 MLS/MIN/1.73/M2
GLOBULIN SER-MCNC: 2.5 GM/DL (ref 2.4–3.5)
GLUCOSE SERPL-MCNC: 104 MG/DL (ref 82–115)
HCT VFR BLD AUTO: 43.5 % (ref 42–52)
HGB BLD-MCNC: 14.3 G/DL (ref 14–18)
IMM GRANULOCYTES # BLD AUTO: 0.05 X10(3)/MCL (ref 0–0.04)
IMM GRANULOCYTES NFR BLD AUTO: 0.8 %
LYMPHOCYTES # BLD AUTO: 1.89 X10(3)/MCL (ref 0.6–4.6)
LYMPHOCYTES NFR BLD AUTO: 30.7 %
MCH RBC QN AUTO: 32.6 PG
MCHC RBC AUTO-ENTMCNC: 32.9 G/DL (ref 33–36)
MCV RBC AUTO: 99.1 FL (ref 80–94)
MONOCYTES # BLD AUTO: 0.66 X10(3)/MCL (ref 0.1–1.3)
MONOCYTES NFR BLD AUTO: 10.7 %
NEUTROPHILS # BLD AUTO: 3.34 X10(3)/MCL (ref 2.1–9.2)
NEUTROPHILS NFR BLD AUTO: 54.3 %
NRBC BLD AUTO-RTO: 0 %
PLATELET # BLD AUTO: 193 X10(3)/MCL (ref 130–400)
PMV BLD AUTO: 11.1 FL (ref 7.4–10.4)
POTASSIUM SERPL-SCNC: 5.1 MMOL/L (ref 3.5–5.1)
PROT SERPL-MCNC: 6.9 GM/DL (ref 5.8–7.6)
RBC # BLD AUTO: 4.39 X10(6)/MCL (ref 4.7–6.1)
SODIUM SERPL-SCNC: 140 MMOL/L (ref 136–145)
WBC # SPEC AUTO: 6.2 X10(3)/MCL (ref 4.5–11.5)

## 2023-03-17 PROCEDURE — 85025 COMPLETE CBC W/AUTO DIFF WBC: CPT | Performed by: FAMILY MEDICINE

## 2023-03-17 PROCEDURE — 99214 OFFICE O/P EST MOD 30 MIN: CPT | Mod: ,,, | Performed by: FAMILY MEDICINE

## 2023-03-17 PROCEDURE — 80053 COMPREHEN METABOLIC PANEL: CPT | Performed by: FAMILY MEDICINE

## 2023-03-17 PROCEDURE — 99214 PR OFFICE/OUTPT VISIT, EST, LEVL IV, 30-39 MIN: ICD-10-PCS | Mod: ,,, | Performed by: FAMILY MEDICINE

## 2023-03-17 PROCEDURE — 93000 ELECTROCARDIOGRAM COMPLETE: CPT | Mod: ,,, | Performed by: FAMILY MEDICINE

## 2023-03-17 PROCEDURE — 36415 COLL VENOUS BLD VENIPUNCTURE: CPT | Performed by: FAMILY MEDICINE

## 2023-03-17 PROCEDURE — 93000 POCT EKG 12-LEAD: ICD-10-PCS | Mod: ,,, | Performed by: FAMILY MEDICINE

## 2023-03-17 NOTE — PATIENT INSTRUCTIONS
Force fluids, rest, no strenuous activity.  Will call with blood work results in 1 day.  ER for worsening symptoms.  Follow up with primary MD.  Possibly due to carotid blockage.

## 2023-03-17 NOTE — PROGRESS NOTES
"Subjective:       Patient ID: Reddy Thapa is a 72 y.o. male.    Vitals:  height is 5' 6" (1.676 m) and weight is 80.3 kg (177 lb). His temperature is 97.5 °F (36.4 °C). His blood pressure is 144/90 (abnormal) and his pulse is 53 (abnormal). His respiration is 18 and oxygen saturation is 99%.     Chief Complaint: Dizziness (X 3 days:  dizziness and off balance )    3 days of feeling off balance with 2 episodes of room spinning at bedtime.  During the day it is worse with standing.  No CP or SOB.  Today feeling off balance when walking.  Hx of carotid stenosis with full on R, partial on L. No recent illness, no NVD.  No recent dehydration episode.   ROS    Objective:      Physical Exam   Constitutional: He is oriented to person, place, and time. He appears well-developed. He is cooperative.  Non-toxic appearance. He does not appear ill. No distress.   HENT:   Head: Normocephalic and atraumatic.   Ears:   Right Ear: Hearing, tympanic membrane, external ear and ear canal normal.   Left Ear: Hearing, tympanic membrane, external ear and ear canal normal.   Nose: Nose normal. No mucosal edema, rhinorrhea or nasal deformity. No epistaxis. Right sinus exhibits no maxillary sinus tenderness and no frontal sinus tenderness. Left sinus exhibits no maxillary sinus tenderness and no frontal sinus tenderness.   Mouth/Throat: Uvula is midline, oropharynx is clear and moist and mucous membranes are normal. Mucous membranes are moist. No trismus in the jaw. Normal dentition. No uvula swelling. No posterior oropharyngeal erythema.   Eyes: Conjunctivae and lids are normal. Pupils are equal, round, and reactive to light. Right eye exhibits no discharge. Left eye exhibits no discharge. No scleral icterus.   Neck: Trachea normal and phonation normal. Neck supple.   Cardiovascular: Normal rate, regular rhythm, normal heart sounds and normal pulses.   Pulmonary/Chest: Effort normal and breath sounds normal. No respiratory distress. "   Abdominal: Normal appearance and bowel sounds are normal. He exhibits no distension and no mass. Soft. There is no abdominal tenderness.   Musculoskeletal: Normal range of motion.         General: No deformity. Normal range of motion.   Neurological: no focal deficit. He is alert and oriented to person, place, and time. He exhibits normal muscle tone. Coordination normal.      Comments: CN II-XII intact, neg pronator drift, neg Milfay Hallpike.    Skin: Skin is warm, dry, intact, not diaphoretic and not pale. Capillary refill takes less than 2 seconds.   Psychiatric: His speech is normal and behavior is normal. Judgment and thought content normal.   Nursing note and vitals reviewed.      Assessment:       1. Near syncope          Plan:         Near syncope  -     CBC Auto Differential  -     Comprehensive Metabolic Panel            EKG NSR, rate 51 bpm.  No ST elevation or depression.

## 2023-03-20 ENCOUNTER — OFFICE VISIT (OUTPATIENT)
Dept: INTERNAL MEDICINE | Facility: CLINIC | Age: 73
End: 2023-03-20
Payer: MEDICARE

## 2023-03-20 VITALS
HEIGHT: 66 IN | SYSTOLIC BLOOD PRESSURE: 120 MMHG | DIASTOLIC BLOOD PRESSURE: 68 MMHG | BODY MASS INDEX: 29.28 KG/M2 | HEART RATE: 62 BPM | OXYGEN SATURATION: 98 % | WEIGHT: 182.19 LBS

## 2023-03-20 DIAGNOSIS — I77.9 CAROTID ARTERY DISEASE, UNSPECIFIED LATERALITY, UNSPECIFIED TYPE: ICD-10-CM

## 2023-03-20 DIAGNOSIS — R42 DIZZY SPELLS: Primary | ICD-10-CM

## 2023-03-20 DIAGNOSIS — I10 ESSENTIAL (PRIMARY) HYPERTENSION: Chronic | ICD-10-CM

## 2023-03-20 PROBLEM — C14.0 THROAT CANCER: Chronic | Status: RESOLVED | Noted: 2022-08-10 | Resolved: 2022-08-10

## 2023-03-20 PROCEDURE — 99213 PR OFFICE/OUTPT VISIT, EST, LEVL III, 20-29 MIN: ICD-10-PCS | Mod: ,,, | Performed by: INTERNAL MEDICINE

## 2023-03-20 PROCEDURE — 99213 OFFICE O/P EST LOW 20 MIN: CPT | Mod: ,,, | Performed by: INTERNAL MEDICINE

## 2023-03-20 RX ORDER — VALSARTAN 160 MG/1
160 TABLET ORAL DAILY
Qty: 90 TABLET | Refills: 3 | Status: SHIPPED | OUTPATIENT
Start: 2023-03-20 | End: 2024-03-25

## 2023-03-20 RX ORDER — RANOLAZINE 500 MG/1
500 TABLET, EXTENDED RELEASE ORAL 2 TIMES DAILY
COMMUNITY
Start: 2023-02-09 | End: 2023-11-13

## 2023-03-20 NOTE — PROGRESS NOTES
Brice Sawyer MD        PATIENT NAME: Reddy Thapa  : 1950  DATE: 3/20/23  MRN: [id number]      Billing Provider: Brice Sawyer MD  Level of Service: [place] OFFICE/OUTPT VISIT, EST, LEVL III, 20-29 MIN  Patient PCP Information       Provider PCP Type    Brice Sawyer MD General            Reason for Visit / Chief Complaint: Dizziness (X2 weeks )       Update PCP  Update Chief Complaint         History of Present Illness / Problem Focused Workflow     Reddy Thapa presents to the clinic with Dizziness (X2 weeks )     Reddy is here for a new problem he is experiencing  For the past few weeks he is developed dizziness   He says when he gets up he has this lightheaded [place] head spinning sensation that goes away fairly quickly he is not had a fall.  He has no chest pain shortness a breath      Review of Systems   Review of Systems   Constitutional: Negative.    HENT: Negative.     Eyes: Negative.    Respiratory: Negative.     Cardiovascular: Negative.    Gastrointestinal: Negative.    Endocrine: Negative.    Genitourinary: Negative.    Musculoskeletal: Negative.    Integumentary:  Negative.   Neurological: Negative.    Psychiatric/Behavioral: Negative.        Medical / Social / Family History     Past Medical History:   Diagnosis Date    Carotid artery disease     Essential (primary) hypertension     Fatty liver     GERD (gastroesophageal reflux disease)     Hyperlipidemia        Past Surgical History:   Procedure Laterality Date    BACK SURGERY      fusionLl3 and L4 cartliage    CARDIAC VALVE REPLACEMENT      LAPAROSCOPIC REPAIR OF INGUINAL HERNIA Left     liver discection      ruptured aorta      SPINE SURGERY         Social History  Mr. Thapa  reports that he has quit smoking. His smoking use included cigars. He has never used smokeless tobacco. He reports current alcohol use of about 3.0 standard drinks per week. He reports current drug use. Frequency: 2.00 times per  week. Drug: Marijuana.    Family History  Mr.'s Thapa  family history includes Alzheimer's disease in his father; COPD in his mother and sister; Emphysema in his mother; Hyperlipidemia in his brother and sister.    Medications and Allergies     Medications  Outpatient Medications Marked as Taking for the 3/20/23 encounter (Office Visit) with Brice Reyna MD   Medication Sig Dispense Refill    aspirin (ECOTRIN) 81 MG EC tablet Take 81 mg by mouth once daily.      atorvastatin (LIPITOR) 40 MG tablet TAKE 1 TABLET BY MOUTH EVERY DAY 90 tablet 2    EScitalopram oxalate (LEXAPRO) 10 MG tablet Take 1 tablet (10 mg total) by mouth once daily. 90 tablet 3    ezetimibe (ZETIA) 10 mg tablet TAKE 1 TABLET BY MOUTH EVERY DAY 90 tablet 3    hydrOXYzine HCL (ATARAX) 25 MG tablet TAKE 1 TABLET BY MOUTH EVERYDAY AT BEDTIME 90 tablet 1    metoprolol succinate (TOPROL-XL) 50 MG 24 hr tablet TAKE 1 TABLET BY MOUTH DAILY 90 tablet 2    RABEprazole (ACIPHEX) 20 mg tablet TAKE 1 TABLET BY MOUTH EVERY DAY 90 tablet 3    ranolazine (RANEXA) 500 MG Tb12 Take 500 mg by mouth 2 (two) times daily.      [DISCONTINUED] valsartan (DIOVAN) 320 MG tablet Take 1 tablet (320 mg total) by mouth once daily. 90 tablet 1       Allergies  Review of patient's allergies indicates:  No Known Allergies    Physical Examination     Vitals:    03/20/23 1420   BP: 120/68   Pulse: 62     Physical Exam  Vitals reviewed: Blood pressure lying down is 120/80 Blood pressure standing is around 100/70 and reports dizziness..   Constitutional:       Appearance: Normal appearance.   HENT:      Head: Normocephalic.      Right Ear: Tympanic membrane normal.      Left Ear: Tympanic membrane normal.      Nose: Nose normal.      Mouth/Throat:      Pharynx: Oropharynx is clear.   Eyes:      Extraocular Movements: Extraocular movements intact.      Pupils: Pupils are equal, round, and reactive to light.   Cardiovascular:      Rate and Rhythm: Normal rate and regular  rhythm.      Pulses: Normal pulses.      Heart sounds: Normal heart sounds.   Pulmonary:      Effort: Pulmonary effort is normal.      Breath sounds: Normal breath sounds.   Abdominal:      General: Abdomen is flat. Bowel sounds are normal.      Palpations: Abdomen is soft.   Musculoskeletal:         General: Normal range of motion.      Cervical back: Normal range of motion.   Skin:     General: Skin is warm and dry.   Neurological:      General: No focal deficit present.      Mental Status: He is alert and oriented to person, place, and time.   Psychiatric:         Mood and Affect: Mood normal.         Behavior: Behavior normal.        Assessment and Plan (including Health Maintenance)      Problem List  Smart Sets  Document Outside HM   :    Plan:   Dizzy spells    Essential (primary) hypertension     Discussed his dizziness and lightheadedness  We will decrease his valsartan from 320-160 his blood pressure issues   Appointment made with Cardiology   Referral made to ENT for evaluation of the dizziness  He will monitor his blood pressure let us know how symptoms are coming.           Health Maintenance Due   Topic Date Due    Hepatitis C Screening  Never done    Pneumococcal Vaccines (Age 65+) (2 - PPSV23 if available, else PCV20) 11/17/2020    Influenza Vaccine (1) 09/01/2022       Problem List Items Addressed This Visit          Cardiac/Vascular    Essential (primary) hypertension (Chronic)     Other Visit Diagnoses       Dizzy spells    -  Primary            Health Maintenance Topics with due status: Not Due       Topic Last Completion Date    Colorectal Cancer Screening 06/30/2020    TETANUS VACCINE 04/29/2021    Lipid Panel 10/17/2022    High Dose Statin 03/17/2023       Future Appointments   Date Time Provider Department Center   9/19/2023  8:40 AM Brice Reyna MD [place] [id number]            Signature:  Brice Reyna MD  [facility]  [id number]  [place] [id number]    Date of encounter: 3/20/23

## 2023-03-20 NOTE — PROGRESS NOTES
Please notify pt that creatinine is elevated.  Force fluids, avoid NSAID, recommend recheck of labs with us or primary MD in the next week.

## 2023-07-26 DIAGNOSIS — F41.9 ANXIETY: ICD-10-CM

## 2023-07-26 RX ORDER — HYDROXYZINE HYDROCHLORIDE 25 MG/1
TABLET, FILM COATED ORAL
Qty: 90 TABLET | Refills: 3 | Status: SHIPPED | OUTPATIENT
Start: 2023-07-26 | End: 2024-03-18

## 2023-08-10 DIAGNOSIS — F32.A DEPRESSION, UNSPECIFIED DEPRESSION TYPE: ICD-10-CM

## 2023-08-10 RX ORDER — ESCITALOPRAM OXALATE 10 MG/1
10 TABLET ORAL
Qty: 90 TABLET | Refills: 3 | Status: SHIPPED | OUTPATIENT
Start: 2023-08-10

## 2023-09-13 ENCOUNTER — TELEPHONE (OUTPATIENT)
Dept: INTERNAL MEDICINE | Facility: CLINIC | Age: 73
End: 2023-09-13
Payer: MEDICARE

## 2023-09-19 ENCOUNTER — TELEPHONE (OUTPATIENT)
Dept: INTERNAL MEDICINE | Facility: CLINIC | Age: 73
End: 2023-09-19

## 2023-09-26 PROBLEM — F32.A ANXIETY AND DEPRESSION: Chronic | Status: ACTIVE | Noted: 2023-09-26

## 2023-09-26 PROBLEM — C10.9 MALIGNANT NEOPLASM OF OROPHARYNX: Status: ACTIVE | Noted: 2022-08-10

## 2023-09-26 PROBLEM — F41.9 ANXIETY AND DEPRESSION: Chronic | Status: ACTIVE | Noted: 2023-09-26

## 2023-11-11 DIAGNOSIS — I77.9 CAROTID ARTERY DISEASE, UNSPECIFIED LATERALITY, UNSPECIFIED TYPE: Chronic | ICD-10-CM

## 2023-11-11 DIAGNOSIS — K21.9 GASTROESOPHAGEAL REFLUX DISEASE, UNSPECIFIED WHETHER ESOPHAGITIS PRESENT: Primary | Chronic | ICD-10-CM

## 2023-11-13 RX ORDER — RANOLAZINE 500 MG/1
500 TABLET, EXTENDED RELEASE ORAL 2 TIMES DAILY
Qty: 180 TABLET | Refills: 2 | Status: SHIPPED | OUTPATIENT
Start: 2023-11-13

## 2024-02-01 ENCOUNTER — TELEPHONE (OUTPATIENT)
Dept: INTERNAL MEDICINE | Facility: CLINIC | Age: 74
End: 2024-02-01
Payer: MEDICARE

## 2024-02-01 DIAGNOSIS — Z00.00 WELLNESS EXAMINATION: Primary | ICD-10-CM

## 2024-02-01 DIAGNOSIS — E78.5 HYPERLIPIDEMIA, UNSPECIFIED HYPERLIPIDEMIA TYPE: ICD-10-CM

## 2024-02-01 DIAGNOSIS — Z12.5 ENCOUNTER FOR SCREENING FOR MALIGNANT NEOPLASM OF PROSTATE: ICD-10-CM

## 2024-02-01 DIAGNOSIS — I10 HYPERTENSION, UNSPECIFIED TYPE: ICD-10-CM

## 2024-02-01 NOTE — TELEPHONE ENCOUNTER
----- Message from Jenniffer Hancock sent at 2/1/2024 12:04 PM CST -----  .Caller is requesting to schedule their Lab appointment prior to annual appointment.  Order is not listed in EPIC.  Please enter order and contact patient to schedule.    Name of Caller:pt    Preferred Date and Time of Labs:    Date of [facility] Appointment:02/08/2024    Where would they like the lab performed?[facility]    Would the patient rather a call back or a response via [facility]? patricia    Best Call Back Number:[phone]    Additional Information:please call him once labs in [facility]

## 2024-02-02 ENCOUNTER — LAB VISIT (OUTPATIENT)
Dept: LAB | Facility: HOSPITAL | Age: 74
End: 2024-02-02
Attending: INTERNAL MEDICINE
Payer: MEDICARE

## 2024-02-02 DIAGNOSIS — Z00.00 WELLNESS EXAMINATION: ICD-10-CM

## 2024-02-02 DIAGNOSIS — Z12.5 ENCOUNTER FOR SCREENING FOR MALIGNANT NEOPLASM OF PROSTATE: ICD-10-CM

## 2024-02-02 DIAGNOSIS — E78.5 HYPERLIPIDEMIA, UNSPECIFIED HYPERLIPIDEMIA TYPE: ICD-10-CM

## 2024-02-02 DIAGNOSIS — I10 HYPERTENSION, UNSPECIFIED TYPE: ICD-10-CM

## 2024-02-02 LAB
ALBUMIN SERPL-MCNC: 4 G/DL (ref 3.4–4.8)
ALBUMIN/GLOB SERPL: 1.7 RATIO (ref 1.1–2)
ALP SERPL-CCNC: 57 UNIT/L (ref 40–150)
ALT SERPL-CCNC: 21 UNIT/L (ref 0–55)
AST SERPL-CCNC: 19 UNIT/L (ref 5–34)
BASOPHILS # BLD AUTO: 0.03 X10(3)/MCL
BASOPHILS NFR BLD AUTO: 0.7 %
BILIRUB SERPL-MCNC: 0.7 MG/DL
BUN SERPL-MCNC: 15.2 MG/DL (ref 8.4–25.7)
CALCIUM SERPL-MCNC: 9.4 MG/DL (ref 8.8–10)
CHLORIDE SERPL-SCNC: 105 MMOL/L (ref 98–107)
CHOLEST SERPL-MCNC: 193 MG/DL
CHOLEST/HDLC SERPL: 4 {RATIO} (ref 0–5)
CO2 SERPL-SCNC: 29 MMOL/L (ref 23–31)
CREAT SERPL-MCNC: 1.05 MG/DL (ref 0.73–1.18)
EOSINOPHIL # BLD AUTO: 0.2 X10(3)/MCL (ref 0–0.9)
EOSINOPHIL NFR BLD AUTO: 4.7 %
ERYTHROCYTE [DISTWIDTH] IN BLOOD BY AUTOMATED COUNT: 12.8 % (ref 11.5–17)
GFR SERPLBLD CREATININE-BSD FMLA CKD-EPI: >60 MLS/MIN/1.73/M2
GLOBULIN SER-MCNC: 2.3 GM/DL (ref 2.4–3.5)
GLUCOSE SERPL-MCNC: 107 MG/DL (ref 82–115)
HCT VFR BLD AUTO: 43.4 % (ref 42–52)
HDLC SERPL-MCNC: 54 MG/DL (ref 35–60)
HGB BLD-MCNC: 14.8 G/DL (ref 14–18)
IMM GRANULOCYTES # BLD AUTO: 0.04 X10(3)/MCL (ref 0–0.04)
IMM GRANULOCYTES NFR BLD AUTO: 0.9 %
LDLC SERPL CALC-MCNC: 106 MG/DL (ref 50–140)
LYMPHOCYTES # BLD AUTO: 1.37 X10(3)/MCL (ref 0.6–4.6)
LYMPHOCYTES NFR BLD AUTO: 32.1 %
MCH RBC QN AUTO: 33.9 PG (ref 27–31)
MCHC RBC AUTO-ENTMCNC: 34.1 G/DL (ref 33–36)
MCV RBC AUTO: 99.3 FL (ref 80–94)
MONOCYTES # BLD AUTO: 0.52 X10(3)/MCL (ref 0.1–1.3)
MONOCYTES NFR BLD AUTO: 12.2 %
NEUTROPHILS # BLD AUTO: 2.11 X10(3)/MCL (ref 2.1–9.2)
NEUTROPHILS NFR BLD AUTO: 49.4 %
NRBC BLD AUTO-RTO: 0 %
PLATELET # BLD AUTO: 159 X10(3)/MCL (ref 130–400)
PMV BLD AUTO: 10.5 FL (ref 7.4–10.4)
POTASSIUM SERPL-SCNC: 4.7 MMOL/L (ref 3.5–5.1)
PROT SERPL-MCNC: 6.3 GM/DL (ref 5.8–7.6)
PSA SERPL-MCNC: 1.05 NG/ML
RBC # BLD AUTO: 4.37 X10(6)/MCL (ref 4.7–6.1)
SODIUM SERPL-SCNC: 139 MMOL/L (ref 136–145)
TRIGL SERPL-MCNC: 166 MG/DL (ref 34–140)
TSH SERPL-ACNC: 1.61 UIU/ML (ref 0.35–4.94)
VLDLC SERPL CALC-MCNC: 33 MG/DL
WBC # SPEC AUTO: 4.27 X10(3)/MCL (ref 4.5–11.5)

## 2024-02-02 PROCEDURE — 84443 ASSAY THYROID STIM HORMONE: CPT

## 2024-02-02 PROCEDURE — 36415 COLL VENOUS BLD VENIPUNCTURE: CPT

## 2024-02-02 PROCEDURE — 80061 LIPID PANEL: CPT

## 2024-02-02 PROCEDURE — 80053 COMPREHEN METABOLIC PANEL: CPT

## 2024-02-02 PROCEDURE — 85025 COMPLETE CBC W/AUTO DIFF WBC: CPT

## 2024-02-02 PROCEDURE — 84153 ASSAY OF PSA TOTAL: CPT

## 2024-02-07 PROBLEM — I77.9 CAROTID ARTERY DISEASE: Chronic | Status: RESOLVED | Noted: 2022-08-10 | Resolved: 2024-02-07

## 2024-02-07 PROBLEM — C10.9 MALIGNANT NEOPLASM OF OROPHARYNX: Status: RESOLVED | Noted: 2022-08-10 | Resolved: 2024-02-07

## 2024-02-07 PROBLEM — Z00.00 WELLNESS EXAMINATION: Status: ACTIVE | Noted: 2024-02-07

## 2024-02-08 ENCOUNTER — OFFICE VISIT (OUTPATIENT)
Dept: INTERNAL MEDICINE | Facility: CLINIC | Age: 74
End: 2024-02-08
Payer: MEDICARE

## 2024-02-08 VITALS
WEIGHT: 186.19 LBS | SYSTOLIC BLOOD PRESSURE: 128 MMHG | HEART RATE: 55 BPM | HEIGHT: 66 IN | BODY MASS INDEX: 29.92 KG/M2 | OXYGEN SATURATION: 95 % | DIASTOLIC BLOOD PRESSURE: 72 MMHG

## 2024-02-08 DIAGNOSIS — R59.1 LYMPHADENOPATHY: ICD-10-CM

## 2024-02-08 DIAGNOSIS — K76.0 FATTY LIVER: Chronic | ICD-10-CM

## 2024-02-08 DIAGNOSIS — Z00.00 WELLNESS EXAMINATION: Primary | ICD-10-CM

## 2024-02-08 DIAGNOSIS — E78.2 MIXED HYPERLIPIDEMIA: Chronic | ICD-10-CM

## 2024-02-08 DIAGNOSIS — N40.0 PROSTATE HYPERTROPHY: ICD-10-CM

## 2024-02-08 DIAGNOSIS — K21.00 GASTROESOPHAGEAL REFLUX DISEASE WITH ESOPHAGITIS WITHOUT HEMORRHAGE: Chronic | ICD-10-CM

## 2024-02-08 DIAGNOSIS — I10 ESSENTIAL (PRIMARY) HYPERTENSION: Chronic | ICD-10-CM

## 2024-02-08 PROCEDURE — G0439 PPPS, SUBSEQ VISIT: HCPCS | Mod: ,,, | Performed by: INTERNAL MEDICINE

## 2024-02-08 RX ORDER — TAMSULOSIN HYDROCHLORIDE 0.4 MG/1
0.4 CAPSULE ORAL DAILY
Qty: 30 CAPSULE | Refills: 11 | Status: SHIPPED | OUTPATIENT
Start: 2024-02-08 | End: 2024-03-18

## 2024-02-08 RX ORDER — ROSUVASTATIN CALCIUM 40 MG/1
40 TABLET, COATED ORAL NIGHTLY
Qty: 90 TABLET | Refills: 3 | Status: SHIPPED | OUTPATIENT
Start: 2024-02-08 | End: 2024-03-18

## 2024-02-08 NOTE — PROGRESS NOTES
Brice Sawyer MD        PATIENT NAME: Reddy Thapa  : 1950  DATE: 24  MRN: [id number]      Patient Care Team:  Brice Sawyer MD as PCP - General (Internal Medicine)  Sanjeev Velasquez III, MD as Consulting Physician (Vascular Surgery)  Jak Mckeon MD as Consulting Physician (Otolaryngology)       Billing Provider: Brice Sawyer MD  Level of Service: [place] MEDICARE ANNUAL WELLNESS SUBSEQUENT VISIT  Patient PCP Information       Provider PCP Type    Brice Sawyer MD General            Reason for Visit / Chief Complaint: Medicare AWV (Wellness/)       Update PCP  Update Chief Complaint         History of Present Illness / Problem Focused Workflow     Reddy Thapa presents to the clinic with Medicare AWV (Wellness/)     Is here for his annual wellness exam   He has a few issues going on  First is urinary issues with difficulty urinating and frequency at night   He also noted a swelling in his left side of his neck over the last few weeks he does have a history of head and neck cancer.        Review of Systems   Review of Systems   Constitutional: Negative.    HENT: Negative.     Eyes: Negative.    Respiratory: Negative.     Cardiovascular: Negative.    Gastrointestinal: Negative.    Endocrine: Negative.    Genitourinary: Negative.    Musculoskeletal: Negative.    Integumentary:  Negative.   Neurological: Negative.    Psychiatric/Behavioral: Negative.          Patient Reported Health Risk Assessment  What [age] 70-79  Are you male or female?: Male  During the past four weeks, how much have you been bothered by emotional problems such as feeling anxious, depressed, irritable, sad, or downhearted and blue?: Not at all  During the past five weeks, has your physical and/or emotional health limited your social activities with family, friends, neighbors, or groups?: Not at all  During the past four weeks, how much bodily pain have you generally had?: No pain  During the  past four weeks, was someone available to help if you needed and wanted help?: Yes, as much as I wanted  During the past four weeks, what was the hardest physical activity you could do for at least two minutes?: Moderate  Can you get to places out of walking distance without help?  (For example, can you travel alone on buses or taxis, or drive your own car?): Yes  Can you go shopping for groceries or clothes without someone's help?: Yes  Can you prepare your own meals?: Yes  Can you do your own housework without help?: Yes  Because of any health problems, do you need the help of another person with your personal care needs such as eating, bathing, dressing, or getting around the house?: No  Can you handle your own money without help?: Yes  During the past four weeks, how would you rate your health in general?: Very good  How have things been going for you during the past four weeks?: Very well  Are you having difficulties driving your car?: No  Do you always fasten your seat belt when you are in a car?: Yes, usually  How often in the past four weeks have you been bothered by falling or dizzy when standing up?: Never  How often in the past four weeks have you been bothered by sexual problems?: Never  How often in the past four weeks have you been bothered by trouble eating well?: Never  How often in the past four weeks have you been bothered by teeth or denture problems?: Never  How often in the past four weeks have you been bothered with problems using the telephone?: Never  How often in the past four weeks have you been bothered by tiredness or fatigue?: Never  Have you fallen two or more times in the past year?: No  Are you afraid of falling?: No  Are you a smoker?: No  During the past four weeks, how many drinks of wine, beer, or other alcoholic beverages did you have?: One drink or less per week  Do you exercise for about 20 minutes three or more days a week?: Yes, some of the time  Have you been given any  information to help you with hazards in your house that might hurt you?: Yes  Have you been given any information to help you with keeping track of your medications?: Yes  How often do you have trouble taking medicines the way you've been told to take them?: I always take them as prescribed  How confident are you that you can control and manage most of your health problems?: Very confident  What is your race? (Check all that apply.):     Medical / Social / Family History     Past Medical History:   Diagnosis Date    Anxiety and depression 9/26/2023    Carotid artery disease     Essential (primary) hypertension     Fatty liver     GERD (gastroesophageal reflux disease)     Hyperlipidemia        Past Surgical History:   Procedure Laterality Date    BACK SURGERY      fusionLl3 and L4 cartliage    CARDIAC VALVE REPLACEMENT  2000    LAPAROSCOPIC REPAIR OF INGUINAL HERNIA Left     liver discection      ruptured aorta      SPINE SURGERY  2000       Social History  Mr. Thapa  reports that he has quit smoking. His smoking use included cigars. He has never used smokeless tobacco. He reports current alcohol use of about 3.0 standard drinks of alcohol per week. He reports current drug use. Frequency: 2.00 times per week. Drug: Marijuana.    Family History  Mr.'s Thapa  family history includes Alzheimer's disease in his father; COPD in his mother and sister; Emphysema in his mother; Hyperlipidemia in his brother and sister.        Medications and Allergies     Medications  Outpatient Medications Marked as Taking for the 2/8/24 encounter (Office Visit) with Brice Reyna MD   Medication Sig Dispense Refill    aspirin (ECOTRIN) 81 MG EC tablet Take 81 mg by mouth once daily.      EScitalopram oxalate (LEXAPRO) 10 MG tablet TAKE 1 TABLET BY MOUTH EVERY DAY 90 tablet 3    ezetimibe (ZETIA) 10 mg tablet TAKE 1 TABLET BY MOUTH EVERY DAY 90 tablet 3    hydrOXYzine HCL (ATARAX) 25 MG tablet TAKE 1 TABLET BY MOUTH  EVERYDAY AT BEDTIME 90 tablet 3    metoprolol succinate (TOPROL-XL) 50 MG 24 hr tablet TAKE 1 TABLET BY MOUTH EVERY DAY 90 tablet 2    RABEprazole (ACIPHEX) 20 mg tablet TAKE 1 TABLET BY MOUTH EVERY DAY 90 tablet 3    ranolazine (RANEXA) 500 MG Tb12 TAKE 1 TABLET BY MOUTH TWICE A  tablet 2    valsartan (DIOVAN) 160 MG tablet Take 1 tablet (160 mg total) by mouth once daily. 90 tablet 3    [DISCONTINUED] atorvastatin (LIPITOR) 40 MG tablet TAKE 1 TABLET BY MOUTH EVERY DAY 90 tablet 2       Allergies  Review of patient's allergies indicates:  No Known Allergies    Physical Examination     Vitals:    02/08/24 1346   BP: 128/72   Pulse: (!) 55     Physical Exam  Vitals reviewed.   Constitutional:       Appearance: Normal appearance.   HENT:      Head: Normocephalic.      Right Ear: Tympanic membrane normal.      Left Ear: Tympanic membrane normal.      Nose: Nose normal.      Mouth/Throat:      Pharynx: Oropharynx is clear.   Eyes:      Extraocular Movements: Extraocular movements intact.      Pupils: Pupils are equal, round, and reactive to light.   Neck:        Comments: Proximally 3 cm lymph node of the left neck noted  Cardiovascular:      Rate and Rhythm: Normal rate and regular rhythm.      Pulses: Normal pulses.      Heart sounds: Normal heart sounds.   Pulmonary:      Effort: Pulmonary effort is normal.      Breath sounds: Normal breath sounds.   Abdominal:      General: Abdomen is flat. Bowel sounds are normal.      Palpations: Abdomen is soft.   Genitourinary:     Testes: Normal.      Prostate: Normal.      Rectum: Normal.   Musculoskeletal:         General: Normal range of motion.      Cervical back: Normal range of motion.   Skin:     General: Skin is warm and dry.   Neurological:      General: No focal deficit present.      Mental Status: He is alert and oriented to person, place, and time.   Psychiatric:         Mood and Affect: Mood normal.         Behavior: Behavior normal.               No data  to display                  2/8/2024     2:20 PM 3/20/2023     2:20 PM 8/10/2022     9:00 AM   Fall Risk Assessment - Outpatient   Mobility Status Ambulatory Ambulatory Ambulatory   Number of falls 0 0 0   Identified as fall risk False False False           Depression Screening  Over the past two weeks, has the patient felt down, depressed, or hopeless?: No  Over the past two weeks, has the patient felt little interest or pleasure in doing things?: No  Functional Ability/Safety Screening  Was the patient's timed Up & Go test unsteady or longer than 30 seconds?: No  Does the patient need help with phone, transportation, shopping, preparing meals, housework, laundry, meds, or managing money?: No  Does the patient's home have rugs in the hallway, lack grab bars in the bathroom, lack handrails on the stairs or have poor lighting?: No  Have you noticed any hearing difficulties?: No  Cognitive Function (Assessed through direct observation with due consideration of information obtained by way of patient reports and/or concerns raised by family, friends, caretakers, or others)    Does the patient repeat questions/statements in the same day?: No  Does the patient have trouble remembering the date, year, and time?: No  Does the patient have difficulty managing finances?: No  Does the patient have a decreased sense of direction?: No    Assessment and Plan (including Health Maintenance)      Problem List  Smart Sets  Document Outside HM   :    Plan:       ICD-10-CM ICD-9-CM   1. Wellness examination  Z00.00 V70.0   2. Essential (primary) hypertension  I10 401.9   3. Fatty liver  K76.0 571.8   4. Mixed hyperlipidemia  E78.2 272.2   5. Lymphadenopathy  R59.1 785.6   6. Prostate hypertrophy  N40.0 600.90   7. Gastroesophageal reflux disease with esophagitis without hemorrhage  K21.00 530.81     530.10               Health Maintenance Due   Topic Date Due    Hepatitis C Screening  Never done    RSV Vaccine (Age 60+ and Pregnant  patients) (1 - 1-dose 60+ series) Never done    Pneumococcal Vaccines (Age 65+) (2 of 2 - PPSV23 or PCV20) 11/17/2020    Influenza Vaccine (1) 09/01/2023    COVID-19 Vaccine (4 - 2023-24 season) 09/01/2023       Problem List Items Addressed This Visit          Cardiac/Vascular    Essential (primary) hypertension (Chronic)    Current Assessment & Plan     Blood pressure stable continue medication         Hyperlipidemia (Chronic)    Current Assessment & Plan     Lipids not at goal   DC Lipitor and begin Crestor 40 mg with follow-up.            Renal/    Prostate hypertrophy    Current Assessment & Plan     Begin Flomax daily            GI    Fatty liver (Chronic)    Current Assessment & Plan     Liver enzymes normal         GERD (gastroesophageal reflux disease) (Chronic)    Current Assessment & Plan     Having significant issues with reflux   We will contact his GI doctor Dr. Zavala for further evaluation            Other    Wellness examination - Primary    Current Assessment & Plan     Discussed all results   He will be back in 1 month with a lymph node in six-month for follow-up of his lipids         Lymphadenopathy    Current Assessment & Plan     The no needs careful follow-up as he has a history of head and neck cancer we will schedule him back in 3-4 weeks to remeasure the node            Health Maintenance Topics with due status: Not Due       Topic Last Completion Date    Colorectal Cancer Screening 06/30/2020    TETANUS VACCINE 04/29/2021    High Dose Statin 04/25/2023    Lipid Panel 02/02/2024       Future Appointments   Date Time Provider Department Center   2/8/2024  2:20 PM Brice Reyna MD [facility]459   3/12/2024  8:00 [id number]   8/21/2024  8:40 AM Brice Reyna MD [id number]          Medicare Annual Wellness and Personalized Prevention Plan:   Fall Risk + Home Safety + Hearing Impairment + Depression Screen +  Cognitive Impairment Screen + Health Risk Assessment all reviewed.         Advance Care Planning   I attest to discussing Advance Care Planning with patient and/or family member.  Education was provided including the importance of the Health Care Power of , Advance Directives, and/or [place] documentation.  The patient expressed understanding to the importance of this information and discussion.       Opioid Screening: Patient medication list reviewed, patient is not taking prescription opioids. Patient is not using additional opioids than prescribed. Patient is at low risk of substance abuse based on this opioid use history.        Signature:  Brice Sawyer MD  [facility]  [address] [place] [phone]    Date of encounter: 2/8/24    Follow up in about 6 months (around 8/8/2024). In addition to their scheduled follow up, the patient has also been instructed to follow up on as needed basis.

## 2024-02-08 NOTE — ASSESSMENT & PLAN NOTE
The no needs careful follow-up as he has a history of head and neck cancer we will schedule him back in 3-4 weeks to remeasure the node

## 2024-02-08 NOTE — ASSESSMENT & PLAN NOTE
Discussed all results   He will be back in 1 month with a lymph node in six-month for follow-up of his lipids

## 2024-02-08 NOTE — ASSESSMENT & PLAN NOTE
Having significant issues with reflux   We will contact his GI doctor Dr. Zavala for further evaluation

## 2024-02-08 NOTE — PATIENT INSTRUCTIONS
Stop lipitor  Call your cardiologist and inquire about Ranexa and need to be on it  Flomax for your bladder  
No risk alerts present

## 2024-02-14 DIAGNOSIS — K21.00 GASTROESOPHAGEAL REFLUX DISEASE WITH ESOPHAGITIS WITHOUT HEMORRHAGE: Primary | ICD-10-CM

## 2024-02-26 ENCOUNTER — TELEPHONE (OUTPATIENT)
Dept: INTERNAL MEDICINE | Facility: CLINIC | Age: 74
End: 2024-02-26
Payer: MEDICARE

## 2024-02-26 NOTE — TELEPHONE ENCOUNTER
Have him stop his Crestor for the next 10 days and see if the symptoms go away.  No need for an appointment that you made yet

## 2024-02-26 NOTE — TELEPHONE ENCOUNTER
----- Message from Momo Ibarra sent at 2/26/2024  1:39 PM CST -----  .Type:  Needs Medical Advice    Who Called: Reddy  Symptoms (please be specific):    How long has patient had these symptoms:    Pharmacy name and phone #:    Would the patient rather a call back or a response via [facility]?   Best Call Back Number: [phone]  Additional Information: Patient requested to speak with the nurse and the doctor about his current medical status. He may need to have cataract surgery he is having problems with his eyes.

## 2024-02-26 NOTE — TELEPHONE ENCOUNTER
Spoke with patient and let him know Dr. Reyna's recommendations to stop Crestor. Verbalized understanding.

## 2024-03-02 DIAGNOSIS — I10 HYPERTENSION, UNSPECIFIED TYPE: ICD-10-CM

## 2024-03-04 RX ORDER — METOPROLOL SUCCINATE 50 MG/1
TABLET, EXTENDED RELEASE ORAL
Qty: 90 TABLET | Refills: 2 | Status: SHIPPED | OUTPATIENT
Start: 2024-03-04

## 2024-03-05 ENCOUNTER — TELEPHONE (OUTPATIENT)
Dept: INTERNAL MEDICINE | Facility: CLINIC | Age: 74
End: 2024-03-05
Payer: MEDICARE

## 2024-03-11 ENCOUNTER — TELEPHONE (OUTPATIENT)
Dept: INTERNAL MEDICINE | Facility: CLINIC | Age: 74
End: 2024-03-11
Payer: MEDICARE

## 2024-03-11 NOTE — TELEPHONE ENCOUNTER
----- Message from Opal Smith sent at 3/11/2024  9:41 AM CDT -----  Regarding: orders  .Caller is requesting to schedule their Lab appointment prior to annual appointment.  Order is not listed in EPIC.  Please enter order and contact patient to schedule.    Name of Caller:sudha Ortez Date and Time of Labs:03/12    Date of EPP Appointment:03/18    Where would they like the lab performed?[facility]    Would the patient rather a call back or a response via [facility]?     Best Call Back Number:[phone]    Additional Information:pt needs orders to  check lymph node pls call pt when orders are in

## 2024-03-12 ENCOUNTER — LAB VISIT (OUTPATIENT)
Dept: LAB | Facility: HOSPITAL | Age: 74
End: 2024-03-12
Attending: INTERNAL MEDICINE
Payer: MEDICARE

## 2024-03-12 DIAGNOSIS — E78.2 MIXED HYPERLIPIDEMIA: Chronic | ICD-10-CM

## 2024-03-12 DIAGNOSIS — I10 ESSENTIAL (PRIMARY) HYPERTENSION: Chronic | ICD-10-CM

## 2024-03-12 LAB
ALBUMIN SERPL-MCNC: 3.9 G/DL (ref 3.4–4.8)
ALBUMIN/GLOB SERPL: 2 RATIO (ref 1.1–2)
ALP SERPL-CCNC: 43 UNIT/L (ref 40–150)
ALT SERPL-CCNC: 18 UNIT/L (ref 0–55)
AST SERPL-CCNC: 19 UNIT/L (ref 5–34)
BILIRUB SERPL-MCNC: 0.5 MG/DL
BUN SERPL-MCNC: 16.3 MG/DL (ref 8.4–25.7)
CALCIUM SERPL-MCNC: 9.1 MG/DL (ref 8.8–10)
CHLORIDE SERPL-SCNC: 104 MMOL/L (ref 98–107)
CHOLEST SERPL-MCNC: 154 MG/DL
CHOLEST/HDLC SERPL: 3 {RATIO} (ref 0–5)
CO2 SERPL-SCNC: 29 MMOL/L (ref 23–31)
CREAT SERPL-MCNC: 1.14 MG/DL (ref 0.73–1.18)
GFR SERPLBLD CREATININE-BSD FMLA CKD-EPI: >60 MLS/MIN/1.73/M2
GLOBULIN SER-MCNC: 2 GM/DL (ref 2.4–3.5)
GLUCOSE SERPL-MCNC: 115 MG/DL (ref 82–115)
HDLC SERPL-MCNC: 52 MG/DL (ref 35–60)
LDLC SERPL CALC-MCNC: 79 MG/DL (ref 50–140)
POTASSIUM SERPL-SCNC: 4.6 MMOL/L (ref 3.5–5.1)
PROT SERPL-MCNC: 5.9 GM/DL (ref 5.8–7.6)
SODIUM SERPL-SCNC: 139 MMOL/L (ref 136–145)
TRIGL SERPL-MCNC: 116 MG/DL (ref 34–140)
VLDLC SERPL CALC-MCNC: 23 MG/DL

## 2024-03-12 PROCEDURE — 80061 LIPID PANEL: CPT

## 2024-03-12 PROCEDURE — 36415 COLL VENOUS BLD VENIPUNCTURE: CPT

## 2024-03-12 PROCEDURE — 80053 COMPREHEN METABOLIC PANEL: CPT

## 2024-03-18 ENCOUNTER — OFFICE VISIT (OUTPATIENT)
Dept: INTERNAL MEDICINE | Facility: CLINIC | Age: 74
End: 2024-03-18
Payer: MEDICARE

## 2024-03-18 VITALS
HEIGHT: 66 IN | SYSTOLIC BLOOD PRESSURE: 136 MMHG | WEIGHT: 182.19 LBS | OXYGEN SATURATION: 98 % | BODY MASS INDEX: 29.28 KG/M2 | HEART RATE: 53 BPM | DIASTOLIC BLOOD PRESSURE: 64 MMHG

## 2024-03-18 DIAGNOSIS — R59.1 LYMPHADENOPATHY: Primary | ICD-10-CM

## 2024-03-18 PROCEDURE — 99213 OFFICE O/P EST LOW 20 MIN: CPT | Mod: ,,, | Performed by: INTERNAL MEDICINE

## 2024-03-18 NOTE — PROGRESS NOTES
Brice Sawyer MD        PATIENT NAME: Reddy Thapa  : 1950  DATE: 3/18/24  MRN: [id number]      Billing Provider: Brice Sawyer MD  Level of Service:   Patient PCP Information       Provider PCP Type    Brice Swayer MD General            Reason for Visit / Chief Complaint: check lymph nodes       Update PCP  Update Chief Complaint         History of Present Illness / Problem Focused Workflow     Reddy Thapa presents to the clinic with check lymph nodes     Reddy is here follow-up lymphadenopathy of the left side of his neck last month   Since then lymph node mass has totally gone away  He has no new lymphadenopathy and no neck pain        Review of Systems   Review of Systems   Constitutional: Negative.    HENT: Negative.     Eyes: Negative.    Respiratory: Negative.     Cardiovascular: Negative.    Gastrointestinal: Negative.    Endocrine: Negative.    Genitourinary: Negative.    Musculoskeletal: Negative.    Integumentary:  Negative.   Neurological: Negative.    Psychiatric/Behavioral: Negative.          Medical / Social / Family History     Past Medical History:   Diagnosis Date    Anxiety and depression 2023    Carotid artery disease     Essential (primary) hypertension     Fatty liver     GERD (gastroesophageal reflux disease)     Hyperlipidemia        Past Surgical History:   Procedure Laterality Date    BACK SURGERY      fusionLl3 and L4 cartliage    CARDIAC VALVE REPLACEMENT      LAPAROSCOPIC REPAIR OF INGUINAL HERNIA Left     liver discection      ruptured aorta      SPINE SURGERY         Social History  Mr. Thapa  reports that he has quit smoking. His smoking use included cigars. He has never used smokeless tobacco. He reports current alcohol use of about 3.0 standard drinks of alcohol per week. He reports current drug use. Frequency: 2.00 times per week. Drug: Marijuana.    Family History  Mr.'s Thapa  family history includes Alzheimer's disease  in his father; COPD in his mother and sister; Emphysema in his mother; Hyperlipidemia in his brother and sister.    Medications and Allergies     Medications  Outpatient Medications Marked as Taking for the 3/18/24 encounter (Office Visit) with Brice Reyna MD   Medication Sig Dispense Refill    aspirin (ECOTRIN) 81 MG EC tablet Take 81 mg by mouth once daily.      EScitalopram oxalate (LEXAPRO) 10 MG tablet TAKE 1 TABLET BY MOUTH EVERY DAY 90 tablet 3    metoprolol succinate (TOPROL-XL) 50 MG 24 hr tablet TAKE 1 TABLET BY MOUTH EVERY DAY 90 tablet 2    RABEprazole (ACIPHEX) 20 mg tablet TAKE 1 TABLET BY MOUTH EVERY DAY 90 tablet 3    ranolazine (RANEXA) 500 MG Tb12 TAKE 1 TABLET BY MOUTH TWICE A  tablet 2    valsartan (DIOVAN) 160 MG tablet Take 1 tablet (160 mg total) by mouth once daily. 90 tablet 3       Allergies  Review of patient's allergies indicates:  No Known Allergies    Physical Examination     Vitals:    03/18/24 1315   BP: 136/64   Pulse: (!) 53     Physical Exam  Vitals reviewed.   Constitutional:       Appearance: Normal appearance.   HENT:      Head: Normocephalic.      Right Ear: Tympanic membrane normal.      Left Ear: Tympanic membrane normal.      Nose: Nose normal.      Mouth/Throat:      Pharynx: Oropharynx is clear.   Eyes:      Extraocular Movements: Extraocular movements intact.      Pupils: Pupils are equal, round, and reactive to light.   Neck:      Comments: Adenopathy previously described is totally gone.  Cardiovascular:      Rate and Rhythm: Normal rate and regular rhythm.      Pulses: Normal pulses.      Heart sounds: Normal heart sounds.   Pulmonary:      Effort: Pulmonary effort is normal.      Breath sounds: Normal breath sounds.   Abdominal:      General: Abdomen is flat. Bowel sounds are normal.      Palpations: Abdomen is soft.   Musculoskeletal:         General: Normal range of motion.      Cervical back: Normal range of motion.   Skin:     General: Skin is warm  and dry.   Neurological:      General: No focal deficit present.      Mental Status: He is alert and oriented to person, place, and time.   Psychiatric:         Mood and Affect: Mood normal.         Behavior: Behavior normal.          Assessment and Plan (including Health Maintenance)      Problem List  Smart Sets  Document Outside HM   :    Plan:    ICD-10-CM ICD-9-CM   1. Lymphadenopathy  R59.1 785.6       Problem List Items Addressed This Visit          Other    Lymphadenopathy - Primary              Health Maintenance Due   Topic Date Due    Hepatitis C Screening  Never done    RSV Vaccine (Age 60+ and Pregnant patients) (1 - 1-dose 60+ series) Never done    Pneumococcal Vaccines (Age 65+) (2 of 2 - PPSV23 or PCV20) 11/17/2020    COVID-19 Vaccine (4 - 2023-24 season) 09/01/2023       Problem List Items Addressed This Visit          Other    Lymphadenopathy - Primary       Health Maintenance Topics with due status: Not Due       Topic Last Completion Date    Colorectal Cancer Screening 06/30/2020    TETANUS VACCINE 04/29/2021    High Dose Statin 02/08/2024    Lipid Panel 03/12/2024       Future Appointments   Date Time Provider Department Center   8/21/2024  8:40 AM Brice Reyna MD [place] [id number]            Signature:  Brice Reyna MD  [facility]  [address] [place] [phone]    Date of encounter: 3/18/24

## 2024-03-23 DIAGNOSIS — I10 ESSENTIAL (PRIMARY) HYPERTENSION: Primary | Chronic | ICD-10-CM

## 2024-03-25 RX ORDER — VALSARTAN 160 MG/1
160 TABLET ORAL
Qty: 90 TABLET | Refills: 3 | Status: SHIPPED | OUTPATIENT
Start: 2024-03-25

## 2024-04-06 DIAGNOSIS — K21.9 GASTROESOPHAGEAL REFLUX DISEASE, UNSPECIFIED WHETHER ESOPHAGITIS PRESENT: ICD-10-CM

## 2024-04-08 RX ORDER — RABEPRAZOLE SODIUM 20 MG/1
TABLET, DELAYED RELEASE ORAL
Qty: 90 TABLET | Refills: 3 | Status: SHIPPED | OUTPATIENT
Start: 2024-04-08

## 2024-05-13 PROBLEM — Z00.00 WELLNESS EXAMINATION: Status: RESOLVED | Noted: 2024-02-07 | Resolved: 2024-05-13

## 2024-08-08 DIAGNOSIS — K76.0 FATTY LIVER: ICD-10-CM

## 2024-08-08 DIAGNOSIS — I10 ESSENTIAL (PRIMARY) HYPERTENSION: Primary | ICD-10-CM

## 2024-08-08 DIAGNOSIS — E78.2 MIXED HYPERLIPIDEMIA: ICD-10-CM

## 2024-08-08 RX ORDER — ATORVASTATIN CALCIUM 40 MG/1
40 TABLET, FILM COATED ORAL DAILY
COMMUNITY
Start: 2024-07-24

## 2024-08-14 ENCOUNTER — TELEPHONE (OUTPATIENT)
Dept: INTERNAL MEDICINE | Facility: CLINIC | Age: 74
End: 2024-08-14
Payer: MEDICARE

## 2024-08-14 DIAGNOSIS — I77.9 CAROTID ARTERY DISEASE, UNSPECIFIED LATERALITY, UNSPECIFIED TYPE: Chronic | ICD-10-CM

## 2024-08-14 DIAGNOSIS — K21.9 GASTROESOPHAGEAL REFLUX DISEASE, UNSPECIFIED WHETHER ESOPHAGITIS PRESENT: Chronic | ICD-10-CM

## 2024-08-14 DIAGNOSIS — F32.A DEPRESSION, UNSPECIFIED DEPRESSION TYPE: ICD-10-CM

## 2024-08-14 RX ORDER — ESCITALOPRAM OXALATE 10 MG/1
10 TABLET ORAL
Qty: 90 TABLET | Refills: 3 | Status: SHIPPED | OUTPATIENT
Start: 2024-08-14

## 2024-08-14 RX ORDER — RANOLAZINE 500 MG/1
500 TABLET, EXTENDED RELEASE ORAL 2 TIMES DAILY
Qty: 180 TABLET | Refills: 2 | Status: SHIPPED | OUTPATIENT
Start: 2024-08-14

## 2024-08-16 ENCOUNTER — LAB VISIT (OUTPATIENT)
Dept: LAB | Facility: HOSPITAL | Age: 74
End: 2024-08-16
Attending: INTERNAL MEDICINE
Payer: MEDICARE

## 2024-08-16 DIAGNOSIS — I10 ESSENTIAL (PRIMARY) HYPERTENSION: ICD-10-CM

## 2024-08-16 DIAGNOSIS — E78.2 MIXED HYPERLIPIDEMIA: ICD-10-CM

## 2024-08-16 DIAGNOSIS — K76.0 FATTY LIVER: ICD-10-CM

## 2024-08-16 LAB
ALBUMIN SERPL-MCNC: 4.1 G/DL (ref 3.4–4.8)
ALBUMIN/GLOB SERPL: 1.6 RATIO (ref 1.1–2)
ALP SERPL-CCNC: 43 UNIT/L (ref 40–150)
ALT SERPL-CCNC: 19 UNIT/L (ref 0–55)
ANION GAP SERPL CALC-SCNC: 8 MEQ/L
AST SERPL-CCNC: 18 UNIT/L (ref 5–34)
BILIRUB SERPL-MCNC: 0.9 MG/DL
BUN SERPL-MCNC: 21 MG/DL (ref 8.4–25.7)
CALCIUM SERPL-MCNC: 9.5 MG/DL (ref 8.8–10)
CHLORIDE SERPL-SCNC: 104 MMOL/L (ref 98–107)
CHOLEST SERPL-MCNC: 198 MG/DL
CHOLEST/HDLC SERPL: 3 {RATIO} (ref 0–5)
CO2 SERPL-SCNC: 28 MMOL/L (ref 23–31)
CREAT SERPL-MCNC: 1.25 MG/DL (ref 0.73–1.18)
CREAT/UREA NIT SERPL: 17
GFR SERPLBLD CREATININE-BSD FMLA CKD-EPI: >60 ML/MIN/1.73/M2
GLOBULIN SER-MCNC: 2.5 GM/DL (ref 2.4–3.5)
GLUCOSE SERPL-MCNC: 109 MG/DL (ref 82–115)
HDLC SERPL-MCNC: 59 MG/DL (ref 35–60)
LDLC SERPL CALC-MCNC: 106 MG/DL (ref 50–140)
POTASSIUM SERPL-SCNC: 4.7 MMOL/L (ref 3.5–5.1)
PROT SERPL-MCNC: 6.6 GM/DL (ref 5.8–7.6)
SODIUM SERPL-SCNC: 140 MMOL/L (ref 136–145)
TRIGL SERPL-MCNC: 163 MG/DL (ref 34–140)
VLDLC SERPL CALC-MCNC: 33 MG/DL

## 2024-08-16 PROCEDURE — 80053 COMPREHEN METABOLIC PANEL: CPT

## 2024-08-16 PROCEDURE — 80061 LIPID PANEL: CPT

## 2024-08-16 PROCEDURE — 36415 COLL VENOUS BLD VENIPUNCTURE: CPT

## 2024-08-21 ENCOUNTER — OFFICE VISIT (OUTPATIENT)
Dept: INTERNAL MEDICINE | Facility: CLINIC | Age: 74
End: 2024-08-21
Payer: MEDICARE

## 2024-08-21 VITALS
HEART RATE: 60 BPM | OXYGEN SATURATION: 99 % | BODY MASS INDEX: 30.55 KG/M2 | HEIGHT: 65 IN | WEIGHT: 183.38 LBS | RESPIRATION RATE: 18 BRPM | DIASTOLIC BLOOD PRESSURE: 89 MMHG | SYSTOLIC BLOOD PRESSURE: 140 MMHG

## 2024-08-21 DIAGNOSIS — N52.9 ERECTILE DYSFUNCTION, UNSPECIFIED ERECTILE DYSFUNCTION TYPE: ICD-10-CM

## 2024-08-21 DIAGNOSIS — N40.0 PROSTATE HYPERTROPHY: ICD-10-CM

## 2024-08-21 DIAGNOSIS — K21.9 GASTROESOPHAGEAL REFLUX DISEASE WITHOUT ESOPHAGITIS: Chronic | ICD-10-CM

## 2024-08-21 DIAGNOSIS — E78.2 MIXED HYPERLIPIDEMIA: Chronic | ICD-10-CM

## 2024-08-21 DIAGNOSIS — I10 ESSENTIAL (PRIMARY) HYPERTENSION: Primary | Chronic | ICD-10-CM

## 2024-08-21 DIAGNOSIS — K76.0 FATTY LIVER: Chronic | ICD-10-CM

## 2024-08-21 PROCEDURE — 99214 OFFICE O/P EST MOD 30 MIN: CPT | Mod: ,,, | Performed by: INTERNAL MEDICINE

## 2024-08-21 RX ORDER — TADALAFIL 20 MG/1
20 TABLET ORAL DAILY
Qty: 10 TABLET | Refills: 11 | Status: SHIPPED | OUTPATIENT
Start: 2024-08-21 | End: 2025-08-21

## 2024-08-21 NOTE — PROGRESS NOTES
Brice Sawyer MD        PATIENT NAME: Reddy Thapa  : 1950  DATE: 24  MRN: [id number]      Billing Provider: Brice Sawyer MD  Level of Service: [place] OFFICE/OUTPT VISIT, EST, LEVL IV, 30-39 MIN  Patient PCP Information       Provider PCP Type    Brice Sawyer MD General            Reason for Visit / Chief Complaint: Hypertension       Update PCP  Update Chief Complaint         History of Present Illness / Problem Focused Workflow     Reddy Thapa presents to the clinic with Hypertension     Reddy is here six-month follow-up   For a while he had gotten off his cholesterol medicine because the pharmacy did not fill it but he is back on it   He has got an neck issue in the left he is going to physical therapy   He is suffering from ED and wants medication.        Review of Systems   Review of Systems   Constitutional: Negative.    HENT: Negative.     Eyes: Negative.    Respiratory: Negative.     Cardiovascular: Negative.    Gastrointestinal: Negative.    Endocrine: Negative.    Genitourinary: Negative.    Musculoskeletal: Negative.    Integumentary:  Negative.   Neurological: Negative.    Psychiatric/Behavioral: Negative.          Medical / Social / Family History     Past Medical History:   Diagnosis Date    Anxiety and depression 2023    Carotid artery disease     Essential (primary) hypertension     Fatty liver     GERD (gastroesophageal reflux disease)     Hyperlipidemia        Past Surgical History:   Procedure Laterality Date    BACK SURGERY      fusionLl3 and L4 cartliage    CARDIAC VALVE REPLACEMENT      LAPAROSCOPIC REPAIR OF INGUINAL HERNIA Left     liver discection      ruptured aorta      SPINE SURGERY         Social History  Mr. Thapa  reports that he has quit smoking. His smoking use included cigars. He has never used smokeless tobacco. He reports current alcohol use of about 3.0 standard drinks of alcohol per week. He reports current drug use.  Frequency: 2.00 times per week. Drug: Marijuana.    Family History  Mr.'s Thapa  family history includes Alzheimer's disease in his father; COPD in his mother and sister; Emphysema in his mother; Hyperlipidemia in his brother and sister.    Medications and Allergies     Medications  Outpatient Medications Marked as Taking for the 8/21/24 encounter (Office Visit) with Brice Reyna MD   Medication Sig Dispense Refill    aspirin (ECOTRIN) 81 MG EC tablet Take 81 mg by mouth once daily.      atorvastatin (LIPITOR) 40 MG tablet Take 40 mg by mouth once daily.      EScitalopram oxalate (LEXAPRO) 10 MG tablet TAKE 1 TABLET BY MOUTH EVERY DAY 90 tablet 3    metoprolol succinate (TOPROL-XL) 50 MG 24 hr tablet TAKE 1 TABLET BY MOUTH EVERY DAY 90 tablet 2    RABEprazole (ACIPHEX) 20 mg tablet TAKE 1 TABLET BY MOUTH EVERY DAY 90 tablet 3    ranolazine (RANEXA) 500 MG Tb12 TAKE 1 TABLET BY MOUTH TWICE A  tablet 2    valsartan (DIOVAN) 160 MG tablet TAKE 1 TABLET BY MOUTH ONCE DAILY. 90 tablet 3       Allergies  Review of patient's allergies indicates:  No Known Allergies    Physical Examination     Vitals:    08/21/24 0846   BP: (!) 140/89   Pulse:    Resp:      Physical Exam  Vitals reviewed.   Constitutional:       Appearance: Normal appearance.   HENT:      Head: Normocephalic.      Right Ear: Tympanic membrane normal.      Left Ear: Tympanic membrane normal.      Nose: Nose normal.      Mouth/Throat:      Pharynx: Oropharynx is clear.   Eyes:      Extraocular Movements: Extraocular movements intact.      Pupils: Pupils are equal, round, and reactive to light.   Cardiovascular:      Rate and Rhythm: Normal rate and regular rhythm.      Pulses: Normal pulses.      Heart sounds: Normal heart sounds.   Pulmonary:      Effort: Pulmonary effort is normal.      Breath sounds: Normal breath sounds.   Abdominal:      General: Abdomen is flat. Bowel sounds are normal.      Palpations: Abdomen is soft.    Musculoskeletal:         General: Normal range of motion.      Cervical back: Normal range of motion.   Skin:     General: Skin is warm and dry.   Neurological:      General: No focal deficit present.      Mental Status: He is alert and oriented to person, place, and time.   Psychiatric:         Mood and Affect: Mood normal.         Behavior: Behavior normal.          Assessment and Plan (including Health Maintenance)      Problem List  Smart Sets  Document Outside HM   :    Plan:    ICD-10-CM ICD-9-CM   1. Essential (primary) hypertension  I10 401.9   2. Fatty liver  K76.0 571.8   3. Gastroesophageal reflux disease without esophagitis  K21.9 530.81   4. Mixed hyperlipidemia  E78.2 272.2   5. Prostate hypertrophy  N40.0 600.90   6. Erectile dysfunction, unspecified erectile dysfunction type  N52.9 607.84       Problem List Items Addressed This Visit          Cardiac/Vascular    Essential (primary) hypertension - Primary (Chronic)     Blood pressure well controlled continue medication         Hyperlipidemia (Chronic)     Lipid level stable continue medication            Renal/    Prostate hypertrophy     PSA level ordered   Cialis ordered for ED.            GI    Fatty liver (Chronic)     Liver enzymes normal no further evaluation         GERD (gastroesophageal reflux disease) (Chronic)     GERD stable continue medication          Other Visit Diagnoses       Erectile dysfunction, unspecified erectile dysfunction type                       Health Maintenance Due   Topic Date Due    Hepatitis C Screening  Never done    RSV Vaccine (Age 60+ and Pregnant patients) (1 - 1-dose 60+ series) Never done    Pneumococcal Vaccines (Age 65+) (2 of 2 - PPSV23 or PCV20) 11/17/2020    COVID-19 Vaccine (4 - 2023-24 season) 09/01/2023       Problem List Items Addressed This Visit          Cardiac/Vascular    Essential (primary) hypertension - Primary (Chronic)    Current Assessment & Plan     Blood pressure well controlled  continue medication         Hyperlipidemia (Chronic)    Current Assessment & Plan     Lipid level stable continue medication            Renal/    Prostate hypertrophy    Current Assessment & Plan     PSA level ordered   Cialis ordered for ED.            GI    Fatty liver (Chronic)    Current Assessment & Plan     Liver enzymes normal no further evaluation         GERD (gastroesophageal reflux disease) (Chronic)    Current Assessment & Plan     GERD stable continue medication          Other Visit Diagnoses       Erectile dysfunction, unspecified erectile dysfunction type                Health Maintenance Topics with due status: Not Due       Topic Last Completion Date    Colorectal Cancer Screening 06/30/2020    TETANUS VACCINE 04/29/2021    Influenza Vaccine 03/18/2024    Lipid Panel 08/16/2024       No future appointments.     I spent a total of 40 minutes on the day of the visit.This includes face to face time and non-face to face time preparing to see the patient (eg, review of tests), obtaining and/or reviewing separately obtained history, documenting clinical information in the electronic or other health record, independently interpreting results and communicating results to the patient/family/caregiver, or care coordinator.      Signature:  Brice Sawyer MD  [facility]  [address]  [place] [id number]    Date of encounter: 8/21/24

## 2024-12-03 DIAGNOSIS — I10 HYPERTENSION, UNSPECIFIED TYPE: ICD-10-CM

## 2024-12-03 RX ORDER — METOPROLOL SUCCINATE 50 MG/1
TABLET, EXTENDED RELEASE ORAL
Qty: 90 TABLET | Refills: 2 | Status: SHIPPED | OUTPATIENT
Start: 2024-12-03

## 2025-02-06 ENCOUNTER — PATIENT OUTREACH (OUTPATIENT)
Facility: CLINIC | Age: 75
End: 2025-02-06
Payer: MEDICARE

## 2025-02-06 NOTE — PROGRESS NOTES
Population Health Outreach.    Updated outside Immunizations     [facility] REPORT       Uncontrolled BP     Pneumonia Vaccine  RSV Vaccine         BP: 140/89 Abnormal   as of 8/21/2024   Last Primary Care Visit Date: 8/21/2024         Next Primary Care Visit Date: 2/21/2025 (wellness/has labs ordered)       Health Maintenance Topic(s) Outreach Outcomes & Actions Taken:  Blood Pressure - Outreach Outcomes & Actions Taken  : Primary Care Follow Up Visit Scheduled    BP check in 2 weeks

## 2025-02-17 ENCOUNTER — TELEPHONE (OUTPATIENT)
Dept: INTERNAL MEDICINE | Facility: CLINIC | Age: 75
End: 2025-02-17
Payer: MEDICARE

## 2025-02-17 NOTE — TELEPHONE ENCOUNTER
----- Message from Med Assistant Godinez sent at 2/14/2025 10:54 AM CST -----  Regarding: OV 2/24/25  Patient has an appointment on 2/24/25  Fasting labs NOT DONE  Please call and remind patient of appointment

## 2025-02-19 ENCOUNTER — LAB VISIT (OUTPATIENT)
Dept: LAB | Facility: HOSPITAL | Age: 75
End: 2025-02-19
Attending: INTERNAL MEDICINE
Payer: MEDICARE

## 2025-02-19 DIAGNOSIS — I10 ESSENTIAL (PRIMARY) HYPERTENSION: ICD-10-CM

## 2025-02-19 DIAGNOSIS — E78.2 MIXED HYPERLIPIDEMIA: Chronic | ICD-10-CM

## 2025-02-19 DIAGNOSIS — N40.0 PROSTATE HYPERTROPHY: ICD-10-CM

## 2025-02-19 LAB
ALBUMIN SERPL-MCNC: 4.4 G/DL (ref 3.4–4.8)
ALBUMIN/GLOB SERPL: 1.8 RATIO (ref 1.1–2)
ALP SERPL-CCNC: 60 UNIT/L (ref 40–150)
ALT SERPL-CCNC: 20 UNIT/L (ref 0–55)
ANION GAP SERPL CALC-SCNC: 7 MEQ/L
AST SERPL-CCNC: 18 UNIT/L (ref 5–34)
BACTERIA #/AREA URNS AUTO: ABNORMAL /HPF
BASOPHILS # BLD AUTO: 0.04 X10(3)/MCL
BASOPHILS NFR BLD AUTO: 0.9 %
BILIRUB SERPL-MCNC: 0.6 MG/DL
BILIRUB UR QL STRIP.AUTO: NEGATIVE
BUN SERPL-MCNC: 17.3 MG/DL (ref 8.4–25.7)
CALCIUM SERPL-MCNC: 9.9 MG/DL (ref 8.8–10)
CHLORIDE SERPL-SCNC: 103 MMOL/L (ref 98–107)
CHOLEST SERPL-MCNC: 184 MG/DL
CHOLEST/HDLC SERPL: 3 {RATIO} (ref 0–5)
CLARITY UR: CLEAR
CO2 SERPL-SCNC: 31 MMOL/L (ref 23–31)
COLOR UR AUTO: YELLOW
CREAT SERPL-MCNC: 1.31 MG/DL (ref 0.72–1.25)
CREAT/UREA NIT SERPL: 13
EOSINOPHIL # BLD AUTO: 0.11 X10(3)/MCL (ref 0–0.9)
EOSINOPHIL NFR BLD AUTO: 2.4 %
ERYTHROCYTE [DISTWIDTH] IN BLOOD BY AUTOMATED COUNT: 12 % (ref 11.5–17)
GFR SERPLBLD CREATININE-BSD FMLA CKD-EPI: 57 ML/MIN/1.73/M2
GLOBULIN SER-MCNC: 2.4 GM/DL (ref 2.4–3.5)
GLUCOSE SERPL-MCNC: 107 MG/DL (ref 82–115)
GLUCOSE UR QL STRIP: NORMAL
HCT VFR BLD AUTO: 46.7 % (ref 42–52)
HDLC SERPL-MCNC: 58 MG/DL (ref 35–60)
HGB BLD-MCNC: 15.6 G/DL (ref 14–18)
HGB UR QL STRIP: NEGATIVE
HYALINE CASTS #/AREA URNS LPF: ABNORMAL /LPF
IMM GRANULOCYTES # BLD AUTO: 0.04 X10(3)/MCL (ref 0–0.04)
IMM GRANULOCYTES NFR BLD AUTO: 0.9 %
KETONES UR QL STRIP: NEGATIVE
LDLC SERPL CALC-MCNC: 106 MG/DL (ref 50–140)
LEUKOCYTE ESTERASE UR QL STRIP: NEGATIVE
LYMPHOCYTES # BLD AUTO: 1.38 X10(3)/MCL (ref 0.6–4.6)
LYMPHOCYTES NFR BLD AUTO: 30.4 %
MCH RBC QN AUTO: 33.6 PG (ref 27–31)
MCHC RBC AUTO-ENTMCNC: 33.4 G/DL (ref 33–36)
MCV RBC AUTO: 100.6 FL (ref 80–94)
MONOCYTES # BLD AUTO: 0.5 X10(3)/MCL (ref 0.1–1.3)
MONOCYTES NFR BLD AUTO: 11 %
MUCOUS THREADS URNS QL MICRO: ABNORMAL /LPF
NEUTROPHILS # BLD AUTO: 2.47 X10(3)/MCL (ref 2.1–9.2)
NEUTROPHILS NFR BLD AUTO: 54.4 %
NITRITE UR QL STRIP: NEGATIVE
NRBC BLD AUTO-RTO: 0 %
PH UR STRIP: 5 [PH]
PLATELET # BLD AUTO: 210 X10(3)/MCL (ref 130–400)
PMV BLD AUTO: 9.9 FL (ref 7.4–10.4)
POTASSIUM SERPL-SCNC: 5.3 MMOL/L (ref 3.5–5.1)
PROT SERPL-MCNC: 6.8 GM/DL (ref 5.8–7.6)
PROT UR QL STRIP: ABNORMAL
PSA SERPL-MCNC: 1.5 NG/ML
RBC # BLD AUTO: 4.64 X10(6)/MCL (ref 4.7–6.1)
RBC #/AREA URNS AUTO: ABNORMAL /HPF
SODIUM SERPL-SCNC: 141 MMOL/L (ref 136–145)
SP GR UR STRIP.AUTO: 1.02 (ref 1–1.03)
SQUAMOUS #/AREA URNS LPF: ABNORMAL /HPF
TRIGL SERPL-MCNC: 102 MG/DL (ref 34–140)
UROBILINOGEN UR STRIP-ACNC: NORMAL
VLDLC SERPL CALC-MCNC: 20 MG/DL
WBC # BLD AUTO: 4.54 X10(3)/MCL (ref 4.5–11.5)
WBC #/AREA URNS AUTO: ABNORMAL /HPF

## 2025-02-19 PROCEDURE — 81003 URINALYSIS AUTO W/O SCOPE: CPT

## 2025-02-19 PROCEDURE — 80061 LIPID PANEL: CPT

## 2025-02-19 PROCEDURE — 80053 COMPREHEN METABOLIC PANEL: CPT

## 2025-02-19 PROCEDURE — 84153 ASSAY OF PSA TOTAL: CPT

## 2025-02-19 PROCEDURE — 85025 COMPLETE CBC W/AUTO DIFF WBC: CPT

## 2025-02-19 PROCEDURE — 36415 COLL VENOUS BLD VENIPUNCTURE: CPT

## 2025-02-24 ENCOUNTER — OFFICE VISIT (OUTPATIENT)
Dept: INTERNAL MEDICINE | Facility: CLINIC | Age: 75
End: 2025-02-24
Payer: MEDICARE

## 2025-02-24 VITALS
OXYGEN SATURATION: 96 % | DIASTOLIC BLOOD PRESSURE: 80 MMHG | RESPIRATION RATE: 18 BRPM | HEART RATE: 57 BPM | HEIGHT: 65 IN | SYSTOLIC BLOOD PRESSURE: 120 MMHG | WEIGHT: 181 LBS | BODY MASS INDEX: 30.16 KG/M2

## 2025-02-24 DIAGNOSIS — R10.84 GENERALIZED ABDOMINAL PAIN: ICD-10-CM

## 2025-02-24 DIAGNOSIS — I10 ESSENTIAL (PRIMARY) HYPERTENSION: Chronic | ICD-10-CM

## 2025-02-24 DIAGNOSIS — K76.0 FATTY LIVER: Chronic | ICD-10-CM

## 2025-02-24 DIAGNOSIS — R10.13 EPIGASTRIC PAIN: ICD-10-CM

## 2025-02-24 DIAGNOSIS — E78.2 MIXED HYPERLIPIDEMIA: Chronic | ICD-10-CM

## 2025-02-24 DIAGNOSIS — Z00.00 WELLNESS EXAMINATION: Primary | ICD-10-CM

## 2025-02-24 DIAGNOSIS — Z12.2 SCREENING FOR LUNG CANCER: ICD-10-CM

## 2025-02-24 DIAGNOSIS — K21.9 GASTROESOPHAGEAL REFLUX DISEASE WITHOUT ESOPHAGITIS: Chronic | ICD-10-CM

## 2025-02-24 NOTE — PROGRESS NOTES
Brice Sawyer MD        PATIENT NAME: Reddy Thapa  : 1950  DATE: 25  MRN: [id number]      Patient Care Team:  Brice Sawyer MD as PCP - General (Internal Medicine)  Sanjeev Velasquez III, MD as Consulting Physician (Vascular Surgery)  Jak Mckeon MD as Consulting Physician (Otolaryngology)  Kandice Aguilar LPN as Care Coordinator       Billing Provider: Brice Sawyer MD  Level of Service: [place] MEDICARE ANNUAL WELLNESS SUBSEQUENT VISIT  Patient PCP Information       Provider PCP Type    Brice Sawyer MD General            Reason for Visit / Chief Complaint: Medicare AWV Follow Up       Update PCP  Update Chief Complaint         History of Present Illness / Problem Focused Workflow     Reddy Thapa presents to the clinic with Medicare AWV Follow Up     Reddy is here for his annual wellness exam   He has a couple of concerns he is concerned about cancer he has had history of head and neck cancer still smokes cigars he has some mild abdominal pain in his history of fatty liver   He also has itching on both of his hands        Review of Systems   Review of Systems   Constitutional: Negative.    HENT: Negative.     Eyes: Negative.    Respiratory: Negative.     Cardiovascular: Negative.    Gastrointestinal: Negative.    Endocrine: Negative.    Genitourinary: Negative.    Musculoskeletal: Negative.    Integumentary:  Negative.   Neurological: Negative.    Psychiatric/Behavioral: Negative.          Patient Reported Health Risk Assessment       Medical / Social / Family History     Past Medical History:   Diagnosis Date    Anxiety and depression 2023    Carotid artery disease     Essential (primary) hypertension     Fatty liver     GERD (gastroesophageal reflux disease)     Hyperlipidemia        Past Surgical History:   Procedure Laterality Date    BACK SURGERY      fusionLl3 and L4 cartliage    CARDIAC VALVE REPLACEMENT      LAPAROSCOPIC REPAIR OF INGUINAL  HERNIA Left     liver discection      ruptured aorta      SPINE SURGERY  2000       Social History  Mr. Thapa  reports that he has quit smoking. His smoking use included cigars. He has never used smokeless tobacco. He reports current alcohol use of about 3.0 standard drinks of alcohol per week. He reports current drug use. Frequency: 2.00 times per week. Drug: Marijuana.    Family History  Mr.'s Thapa  family history includes Alzheimer's disease in his father; COPD in his mother and sister; Emphysema in his mother; Hyperlipidemia in his brother and sister.        Medications and Allergies     Medications  Outpatient Medications Marked as Taking for the 2/24/25 encounter (Office Visit) with Brice Reyna MD   Medication Sig Dispense Refill    atorvastatin (LIPITOR) 40 MG tablet Take 40 mg by mouth once daily.      EScitalopram oxalate (LEXAPRO) 10 MG tablet TAKE 1 TABLET BY MOUTH EVERY DAY 90 tablet 3    metoprolol succinate (TOPROL-XL) 50 MG 24 hr tablet TAKE 1 TABLET BY MOUTH EVERY DAY 90 tablet 2    RABEprazole (ACIPHEX) 20 mg tablet TAKE 1 TABLET BY MOUTH EVERY DAY 90 tablet 3    ranolazine (RANEXA) 500 MG Tb12 TAKE 1 TABLET BY MOUTH TWICE A  tablet 2    tadalafiL (CIALIS) 20 MG Tab Take 1 tablet (20 mg total) by mouth once daily. 10 tablet 11    valsartan (DIOVAN) 160 MG tablet TAKE 1 TABLET BY MOUTH ONCE DAILY. 90 tablet 3       Allergies  Review of patient's allergies indicates:  No Known Allergies    Physical Examination     Vitals:    02/24/25 0758   BP: 120/80   Pulse: (!) 57   Resp: 18     Physical Exam  Vitals reviewed.   Constitutional:       Appearance: Normal appearance.   HENT:      Head: Normocephalic.      Right Ear: Tympanic membrane normal.      Left Ear: Tympanic membrane normal.      Nose: Nose normal.      Mouth/Throat:      Pharynx: Oropharynx is clear.   Eyes:      Extraocular Movements: Extraocular movements intact.      Pupils: Pupils are equal, round, and reactive to  light.   Cardiovascular:      Rate and Rhythm: Normal rate and regular rhythm.      Pulses: Normal pulses.      Heart sounds: Normal heart sounds.   Pulmonary:      Effort: Pulmonary effort is normal.      Breath sounds: Normal breath sounds.   Abdominal:      General: Abdomen is flat. Bowel sounds are normal.      Palpations: Abdomen is soft.   Genitourinary:     Testes: Normal.      Prostate: Normal.      Rectum: Normal.   Musculoskeletal:         General: Normal range of motion.      Cervical back: Normal range of motion.   Skin:     General: Skin is warm and dry.   Neurological:      General: No focal deficit present.      Mental Status: He is alert and oriented to person, place, and time.   Psychiatric:         Mood and Affect: Mood normal.         Behavior: Behavior normal.               No data to display                  2/24/2025     8:00 AM 8/21/2024     8:40 AM 3/18/2024     1:20 PM 2/8/2024     2:20 PM 3/20/2023     2:20 PM 8/10/2022     9:00 AM   Fall Risk Assessment - Outpatient   Mobility Status Ambulatory Ambulatory Ambulatory Ambulatory Ambulatory Ambulatory   Number of falls 0 0 0 0 0 0   Identified as fall risk False False False False False False                Assessment and Plan (including Health Maintenance)      Problem List  Smart Sets  Document Outside HM   :    Plan:       ICD-10-CM ICD-9-CM   1. Wellness examination  Z00.00 V70.0   2. Mixed hyperlipidemia  E78.2 272.2   3. Gastroesophageal reflux disease without esophagitis  K21.9 530.81   4. Fatty liver  K76.0 571.8   5. Essential (primary) hypertension  I10 401.9   6. Screening for lung cancer  Z12.2 V76.0   7. Generalized abdominal pain  R10.84 789.07   8. Epigastric pain  R10.13 789.06               Health Maintenance Due   Topic Date Due    Hepatitis C Screening  Never done    RSV Vaccine (Age 60+ and Pregnant patients) (1 - Risk 60-74 years 1-dose series) Never done    Pneumococcal Vaccines (Age 50+) (2 of 2 - PPSV23) 11/17/2020     COVID-19 Vaccine (4 - 2024-25 season) 09/01/2024       Problem List Items Addressed This Visit          Cardiac/Vascular    Essential (primary) hypertension (Chronic)    Current Assessment & Plan   Create blood pressure continue medication         Hyperlipidemia (Chronic)    Current Assessment & Plan   Lipid level stable continue treatment            GI    Generalized abdominal pain    Current Assessment & Plan   Needs further assessment CT ordered         Fatty liver (Chronic)    Current Assessment & Plan   Needs re-evaluation CT abdomen ordered         GERD (gastroesophageal reflux disease) (Chronic)    Current Assessment & Plan   Reflux controlled on medication            Other    RESOLVED: Wellness examination - Primary    Current Assessment & Plan   Discussed results of laboratory examination   BUN and creatinine slightly elevated   He will repeat these hydrated.          Other Visit Diagnoses         Screening for lung cancer          Epigastric pain                Health Maintenance Topics with due status: Not Due       Topic Last Completion Date    Colorectal Cancer Screening 06/30/2020    TETANUS VACCINE 04/29/2021    Lipid Panel 02/19/2025       Future Appointments   Date Time Provider Department Center   9/25/2025  8:00 AM Brice Reyna MD [facility] [id number] Medicare Annual Wellness and Personalized Prevention Plan:   Fall Risk + Home Safety + Hearing Impairment + Depression Screen + Cognitive Impairment Screen + Health Risk Assessment all reviewed.         Advance Care Planning     Date: 02/24/2025  Patient did not wish or was not able to name a surrogate decision maker or provide an Advance Care Plan.           Opioid Screening: Patient medication list reviewed, patient is not taking prescription opioids. Patient is not using additional opioids than prescribed. Patient is at low risk of substance abuse based on this opioid use history.        Signature:  Brice Reyna  MD [facility]  [address]  [place] [id number]    Date of encounter: 2/24/25    Follow up in about 6 months (around 8/24/2025). In addition to their scheduled follow up, the patient has also been instructed to follow up on as needed basis.

## 2025-02-24 NOTE — ASSESSMENT & PLAN NOTE
Discussed results of laboratory examination   BUN and creatinine slightly elevated   He will repeat these hydrated.

## 2025-02-24 NOTE — PATIENT INSTRUCTIONS
Aquaphor to hands every night  Do repeat lab work for your kidney and drink a lot of water before test

## 2025-02-25 ENCOUNTER — LAB VISIT (OUTPATIENT)
Dept: LAB | Facility: HOSPITAL | Age: 75
End: 2025-02-25
Attending: INTERNAL MEDICINE
Payer: MEDICARE

## 2025-02-25 ENCOUNTER — RESULTS FOLLOW-UP (OUTPATIENT)
Dept: INTERNAL MEDICINE | Facility: CLINIC | Age: 75
End: 2025-02-25
Payer: MEDICARE

## 2025-02-25 DIAGNOSIS — I10 ESSENTIAL (PRIMARY) HYPERTENSION: Chronic | ICD-10-CM

## 2025-02-25 LAB
ANION GAP SERPL CALC-SCNC: 9 MEQ/L
BUN SERPL-MCNC: 24.9 MG/DL (ref 8.4–25.7)
CALCIUM SERPL-MCNC: 9.1 MG/DL (ref 8.8–10)
CHLORIDE SERPL-SCNC: 100 MMOL/L (ref 98–107)
CO2 SERPL-SCNC: 27 MMOL/L (ref 23–31)
CREAT SERPL-MCNC: 1.17 MG/DL (ref 0.72–1.25)
CREAT/UREA NIT SERPL: 21
GFR SERPLBLD CREATININE-BSD FMLA CKD-EPI: >60 ML/MIN/1.73/M2
GLUCOSE SERPL-MCNC: 104 MG/DL (ref 82–115)
POTASSIUM SERPL-SCNC: 5 MMOL/L (ref 3.5–5.1)
SODIUM SERPL-SCNC: 136 MMOL/L (ref 136–145)

## 2025-02-25 PROCEDURE — 36415 COLL VENOUS BLD VENIPUNCTURE: CPT

## 2025-02-25 PROCEDURE — 80048 BASIC METABOLIC PNL TOTAL CA: CPT

## 2025-02-25 NOTE — TELEPHONE ENCOUNTER
----- Message from Brice Reyna MD sent at 2/25/2025 10:09 AM CST -----      ----- Message -----  From: Lab, Background User  Sent: 2/25/2025   9:17 AM CST  To: Brice Reyna MD

## 2025-02-27 ENCOUNTER — HOSPITAL ENCOUNTER (OUTPATIENT)
Dept: RADIOLOGY | Facility: HOSPITAL | Age: 75
Discharge: HOME | End: 2025-02-27
Attending: INTERNAL MEDICINE
Payer: MEDICARE

## 2025-02-27 DIAGNOSIS — R10.13 EPIGASTRIC PAIN: ICD-10-CM

## 2025-02-27 DIAGNOSIS — K76.0 FATTY LIVER: Chronic | ICD-10-CM

## 2025-02-27 PROCEDURE — 74178 CT ABD&PLV WO CNTR FLWD CNTR: CPT | Mod: TC

## 2025-02-27 PROCEDURE — 25500020 PHARM REV CODE 255: Performed by: INTERNAL MEDICINE

## 2025-02-27 RX ORDER — DIATRIZOATE MEGLUMINE AND DIATRIZOATE SODIUM 660; 100 MG/ML; MG/ML
30 SOLUTION ORAL; RECTAL
Status: DISCONTINUED | OUTPATIENT
Start: 2025-02-27 | End: 2025-02-28 | Stop reason: HOSPADM

## 2025-02-27 RX ADMIN — IOHEXOL 100 ML: 350 INJECTION, SOLUTION INTRAVENOUS at 02:02

## 2025-03-04 DIAGNOSIS — I77.9 CAROTID ARTERY DISEASE, UNSPECIFIED LATERALITY, UNSPECIFIED TYPE: Chronic | ICD-10-CM

## 2025-03-04 DIAGNOSIS — K21.9 GASTROESOPHAGEAL REFLUX DISEASE, UNSPECIFIED WHETHER ESOPHAGITIS PRESENT: Chronic | ICD-10-CM

## 2025-03-04 DIAGNOSIS — I10 ESSENTIAL (PRIMARY) HYPERTENSION: Chronic | ICD-10-CM

## 2025-03-05 RX ORDER — RANOLAZINE 500 MG/1
500 TABLET, EXTENDED RELEASE ORAL 2 TIMES DAILY
Qty: 180 TABLET | Refills: 2 | Status: SHIPPED | OUTPATIENT
Start: 2025-03-05

## 2025-03-05 RX ORDER — VALSARTAN 160 MG/1
160 TABLET ORAL
Qty: 90 TABLET | Refills: 3 | Status: SHIPPED | OUTPATIENT
Start: 2025-03-05

## 2025-03-10 ENCOUNTER — TELEPHONE (OUTPATIENT)
Dept: INTERNAL MEDICINE | Facility: CLINIC | Age: 75
End: 2025-03-10
Payer: MEDICARE

## 2025-03-10 NOTE — TELEPHONE ENCOUNTER
Spoke with pt he was advise that the CT has to be reviewed by the provider first they we call with the results

## 2025-03-10 NOTE — TELEPHONE ENCOUNTER
----- Message from Karrie sent at 3/10/2025  8:39 AM CDT -----  Who Called: Reddy Hernandez Lola is requesting information on test results.Name of Test (Lab/Mammo/Etc): CTDate of Test: 02/27[facility] the [facility] Provider: Preferred Method of Contact: Phone CallPatient's Preferred Phone Number on File: [phone] Best Call Back Number, if different:Additional Information: Pt states he has never received his results of CT, requesting cb.

## 2025-03-12 DIAGNOSIS — K21.9 GASTROESOPHAGEAL REFLUX DISEASE, UNSPECIFIED WHETHER ESOPHAGITIS PRESENT: ICD-10-CM

## 2025-03-12 RX ORDER — RABEPRAZOLE SODIUM 20 MG/1
20 TABLET, DELAYED RELEASE ORAL
Qty: 90 TABLET | Refills: 3 | Status: SHIPPED | OUTPATIENT
Start: 2025-03-12

## 2025-03-13 ENCOUNTER — TELEPHONE (OUTPATIENT)
Dept: INTERNAL MEDICINE | Facility: CLINIC | Age: 75
End: 2025-03-13
Payer: MEDICARE

## 2025-03-13 NOTE — TELEPHONE ENCOUNTER
----- Message from [place] sent at 3/13/2025 10:27 AM CDT -----  Who Called: Reddy Davila is requesting assistance/information from provider's office.Symptoms (please be specific):  How long has patient had these symptoms:  List of preferred pharmacies on file (remove unneeded): [unfilled]If different, enter pharmacy into here including location and phone number: Preferred Method of Contact: Phone CallPatient's Preferred Phone Number on File: [phone] Best Call Back Number, if different:Additional Information: Pt called to speak for nurse about his recent Ct scan

## 2025-03-13 NOTE — TELEPHONE ENCOUNTER
Goal Reddy reports of his CT abdomen   Asymptomatic cholelithiasis with no pathology noted   He is doing well with no pain   No further evaluation needed

## 2025-05-19 ENCOUNTER — OFFICE VISIT (OUTPATIENT)
Dept: INTERNAL MEDICINE | Facility: CLINIC | Age: 75
End: 2025-05-19
Payer: MEDICARE

## 2025-05-19 ENCOUNTER — LAB VISIT (OUTPATIENT)
Dept: LAB | Facility: HOSPITAL | Age: 75
End: 2025-05-19
Attending: INTERNAL MEDICINE
Payer: MEDICARE

## 2025-05-19 VITALS
HEIGHT: 66 IN | DIASTOLIC BLOOD PRESSURE: 76 MMHG | HEART RATE: 54 BPM | BODY MASS INDEX: 29.57 KG/M2 | RESPIRATION RATE: 18 BRPM | OXYGEN SATURATION: 98 % | WEIGHT: 184 LBS | SYSTOLIC BLOOD PRESSURE: 120 MMHG

## 2025-05-19 DIAGNOSIS — M54.41 ACUTE MIDLINE LOW BACK PAIN WITH RIGHT-SIDED SCIATICA: Primary | ICD-10-CM

## 2025-05-19 DIAGNOSIS — Z12.5 SCREENING PSA (PROSTATE SPECIFIC ANTIGEN): ICD-10-CM

## 2025-05-19 DIAGNOSIS — M54.41 ACUTE MIDLINE LOW BACK PAIN WITH RIGHT-SIDED SCIATICA: ICD-10-CM

## 2025-05-19 DIAGNOSIS — R35.0 URINARY FREQUENCY: ICD-10-CM

## 2025-05-19 PROBLEM — M54.50 ACUTE LOW BACK PAIN: Status: ACTIVE | Noted: 2025-05-19

## 2025-05-19 LAB
BACTERIA #/AREA URNS AUTO: NORMAL /HPF
BILIRUB UR QL STRIP.AUTO: NEGATIVE
CLARITY UR: CLEAR
COLOR UR AUTO: COLORLESS
GLUCOSE UR QL STRIP: NORMAL
HGB UR QL STRIP: NEGATIVE
KETONES UR QL STRIP: NEGATIVE
LEUKOCYTE ESTERASE UR QL STRIP: NEGATIVE
NITRITE UR QL STRIP: NEGATIVE
PH UR STRIP: 7.5 [PH]
PROT UR QL STRIP: NEGATIVE
PSA SERPL-MCNC: 1.15 NG/ML
RBC #/AREA URNS AUTO: NORMAL /HPF
SP GR UR STRIP.AUTO: 1.01 (ref 1–1.03)
SQUAMOUS #/AREA URNS LPF: NORMAL /HPF
UROBILINOGEN UR STRIP-ACNC: NORMAL
WBC #/AREA URNS AUTO: NORMAL /HPF

## 2025-05-19 PROCEDURE — 84153 ASSAY OF PSA TOTAL: CPT

## 2025-05-19 PROCEDURE — 36415 COLL VENOUS BLD VENIPUNCTURE: CPT

## 2025-05-19 PROCEDURE — 81001 URINALYSIS AUTO W/SCOPE: CPT

## 2025-05-19 PROCEDURE — 99214 OFFICE O/P EST MOD 30 MIN: CPT | Mod: ,,, | Performed by: INTERNAL MEDICINE

## 2025-05-19 RX ORDER — HYDROCODONE BITARTRATE AND ACETAMINOPHEN 7.5; 325 MG/1; MG/1
1 TABLET ORAL 4 TIMES DAILY PRN
COMMUNITY
Start: 2025-05-13

## 2025-05-19 RX ORDER — TAMSULOSIN HYDROCHLORIDE 0.4 MG/1
0.4 CAPSULE ORAL DAILY
Qty: 30 CAPSULE | Refills: 11 | Status: SHIPPED | OUTPATIENT
Start: 2025-05-19 | End: 2026-05-19

## 2025-05-19 RX ORDER — CLOPIDOGREL BISULFATE 75 MG/1
75 TABLET ORAL DAILY
COMMUNITY
Start: 2025-05-13

## 2025-05-19 NOTE — PROGRESS NOTES
Brice Sawyer MD        PATIENT NAME: Reddy Thapa  : 1950  DATE: 25  MRN: [id number]      Billing Provider: Brice Sawyer MD  Level of Service: [place] OFFICE/OUTPT VISIT, ESTJIMBO IV, 30-39 MIN  Patient PCP Information       Provider PCP Type    Brice Sawyer MD General            Reason for Visit / Chief Complaint: Back Pain (Patient reports he has low left back pain right above his gluteus. He reports that he has been having pain for 1.5-2 months.) and Urinary Frequency (Patient reports he is urinating a lot. He states he goes to the restroom about 7 times a day and 5 times a night. He reports he has had symptoms for about 3-4 months.)       Update PCP  Update Chief Complaint         History of Present Illness / Problem Focused Workflow     Reddy Thapa presents to the clinic with Back Pain (Patient reports he has low left back pain right above his gluteus. He reports that he has been having pain for 1.5-2 months.) and Urinary Frequency (Patient reports he is urinating a lot. He states he goes to the restroom about 7 times a day and 5 times a night. He reports he has had symptoms for about 3-4 months.)     Reddy is here for a couple of problems he is having  For the past few months he has had significant urinary frequency urinating 6-7 times at night  In the past he had been on Flomax what has been off of it for some unknown reason   He also has low back pain to the right side he is worried about his kidneys in his back        Review of Systems   Review of Systems   Constitutional: Negative.    HENT: Negative.     Eyes: Negative.    Respiratory: Negative.     Cardiovascular: Negative.    Gastrointestinal: Negative.    Endocrine: Negative.    Genitourinary: Negative.    Musculoskeletal: Negative.    Integumentary:  Negative.   Neurological: Negative.    Psychiatric/Behavioral: Negative.          Medical / Social / Family History     Past Medical History:   Diagnosis Date     Anxiety and depression 9/26/2023    Carotid artery disease     Essential (primary) hypertension     Fatty liver     GERD (gastroesophageal reflux disease)     Hyperlipidemia        Past Surgical History:   Procedure Laterality Date    BACK SURGERY      fusionLl3 and L4 cartliage    CARDIAC VALVE REPLACEMENT  2000    LAPAROSCOPIC REPAIR OF INGUINAL HERNIA Left     liver discection      ruptured aorta      SPINE SURGERY  2000       Social History  Mr. Thapa  reports that he has quit smoking. His smoking use included cigars. He has never used smokeless tobacco. He reports current alcohol use of about 3.0 standard drinks of alcohol per week. He reports current drug use. Frequency: 2.00 times per week. Drug: Marijuana.    Family History  Mr.'s Thapa  family history includes Alzheimer's disease in his father; COPD in his mother and sister; Emphysema in his mother; Hyperlipidemia in his brother and sister.    Medications and Allergies     Medications  Outpatient Medications Marked as Taking for the 5/19/25 encounter (Office Visit) with Brice Reyna MD   Medication Sig Dispense Refill    atorvastatin (LIPITOR) 40 MG tablet Take 40 mg by mouth once daily.      clopidogreL (PLAVIX) 75 mg tablet Take 75 mg by mouth once daily.      EScitalopram oxalate (LEXAPRO) 10 MG tablet TAKE 1 TABLET BY MOUTH EVERY DAY 90 tablet 3    HYDROcodone-acetaminophen (NORCO) 7.5-325 mg per tablet Take 1 tablet by mouth 4 (four) times daily as needed.      metoprolol succinate (TOPROL-XL) 50 MG 24 hr tablet TAKE 1 TABLET BY MOUTH EVERY DAY 90 tablet 2    RABEprazole (ACIPHEX) 20 mg tablet TAKE 1 TABLET BY MOUTH EVERY DAY 90 tablet 3    ranolazine (RANEXA) 500 MG Tb12 TAKE 1 TABLET BY MOUTH TWICE A  tablet 2    tadalafiL (CIALIS) 20 MG Tab Take 1 tablet (20 mg total) by mouth once daily. 10 tablet 11    valsartan (DIOVAN) 160 MG tablet TAKE 1 TABLET BY MOUTH EVERY DAY 90 tablet 3       Allergies  Review of patient's allergies  indicates:  No Known Allergies    Physical Examination     Vitals:    05/19/25 1353   BP: 120/76   Pulse: (!) 54   Resp: 18     Physical Exam  Vitals reviewed.   Constitutional:       Appearance: Normal appearance.   HENT:      Head: Normocephalic.      Right Ear: Tympanic membrane normal.      Left Ear: Tympanic membrane normal.      Nose: Nose normal.      Mouth/Throat:      Pharynx: Oropharynx is clear.   Eyes:      Extraocular Movements: Extraocular movements intact.      Pupils: Pupils are equal, round, and reactive to light.   Cardiovascular:      Rate and Rhythm: Normal rate and regular rhythm.      Pulses: Normal pulses.      Heart sounds: Normal heart sounds.   Pulmonary:      Effort: Pulmonary effort is normal.      Breath sounds: Normal breath sounds.   Abdominal:      General: Abdomen is flat. Bowel sounds are normal.      Palpations: Abdomen is soft.   Musculoskeletal:         General: Normal range of motion.        Arms:       Cervical back: Normal range of motion.      Comments: Tenderness to palpation left lower side of the back   Skin:     General: Skin is warm and dry.   Neurological:      General: No focal deficit present.      Mental Status: He is alert and oriented to person, place, and time.   Psychiatric:         Mood and Affect: Mood normal.         Behavior: Behavior normal.          Assessment and Plan (including Health Maintenance)      Problem List  Smart Sets  Document Outside HM   :    Plan:    ICD-10-CM ICD-9-CM   1. Acute midline low back pain with right-sided sciatica  M54.41 724.2     724.3   2. Urinary frequency  R35.0 788.41   3. Screening PSA (prostate specific antigen)  Z12.5 V76.44       Problem List Items Addressed This Visit          Renal/    Urinary frequency    Urinalysis PSA and renal ultrasound ordered   Begin Flomax daily            Orthopedic    Acute low back pain - Primary    Radiographic studies order the back and pelvis          Other Visit Diagnoses          Screening PSA (prostate specific antigen)                       Health Maintenance Due   Topic Date Due    Hepatitis C Screening  Never done    RSV Vaccine (Age 60+ and Pregnant patients) (1 - Risk 60-74 years 1-dose series) Never done    Pneumococcal Vaccines (Age 50+) (2 of 2 - PPSV23) 11/17/2020    COVID-19 Vaccine (4 - 2024-25 season) 09/01/2024       Problem List Items Addressed This Visit          Renal/    Urinary frequency    Current Assessment & Plan   Urinalysis PSA and renal ultrasound ordered   Begin Flomax daily            Orthopedic    Acute low back pain - Primary    Current Assessment & Plan   Radiographic studies order the back and pelvis          Other Visit Diagnoses         Screening PSA (prostate specific antigen)                Health Maintenance Topics with due status: Not Due       Topic Last Completion Date    Colorectal Cancer Screening 06/30/2020    TETANUS VACCINE 04/29/2021    Lipid Panel 02/19/2025       Future Appointments   Date Time Provider Department Center   9/25/2025  8:00 AM Brice Reyna MD [place] [id number]      I spent a total of 37 minutes on the day of the visit.This includes face to face time and non-face to face time preparing to see the patient (eg, review of tests), obtaining and/or reviewing separately obtained history, documenting clinical information in the electronic or other health record, independently interpreting results and communicating results to the patient/family/caregiver, or care coordinator.        Signature:  Brice Reyna MD  [facility]  [address] [place] [id number]    Date of encounter: 5/19/25

## 2025-05-20 ENCOUNTER — RESULTS FOLLOW-UP (OUTPATIENT)
Dept: INTERNAL MEDICINE | Facility: CLINIC | Age: 75
End: 2025-05-20

## 2025-05-20 ENCOUNTER — TELEPHONE (OUTPATIENT)
Dept: INTERNAL MEDICINE | Facility: CLINIC | Age: 75
End: 2025-05-20
Payer: MEDICARE

## 2025-05-20 NOTE — TELEPHONE ENCOUNTER
Abdominal renal ultrasound normal   Urinalysis normal   X-ray lumbar spine arthritis   X-ray hip normal.

## 2025-05-30 DIAGNOSIS — F32.A DEPRESSION, UNSPECIFIED DEPRESSION TYPE: ICD-10-CM

## 2025-05-30 RX ORDER — ESCITALOPRAM OXALATE 10 MG/1
10 TABLET ORAL
Qty: 90 TABLET | Refills: 3 | Status: SHIPPED | OUTPATIENT
Start: 2025-05-30

## 2025-06-03 ENCOUNTER — PATIENT OUTREACH (OUTPATIENT)
Dept: ADMINISTRATIVE | Facility: CLINIC | Age: 75
End: 2025-06-03
Payer: MEDICARE

## 2025-06-11 ENCOUNTER — TELEPHONE (OUTPATIENT)
Dept: INTERNAL MEDICINE | Facility: CLINIC | Age: 75
End: 2025-06-11

## 2025-06-11 ENCOUNTER — OFFICE VISIT (OUTPATIENT)
Dept: INTERNAL MEDICINE | Facility: CLINIC | Age: 75
End: 2025-06-11
Payer: MEDICARE

## 2025-06-11 ENCOUNTER — LAB VISIT (OUTPATIENT)
Dept: LAB | Facility: HOSPITAL | Age: 75
End: 2025-06-11
Attending: INTERNAL MEDICINE
Payer: MEDICARE

## 2025-06-11 ENCOUNTER — RESULTS FOLLOW-UP (OUTPATIENT)
Dept: INTERNAL MEDICINE | Facility: CLINIC | Age: 75
End: 2025-06-11

## 2025-06-11 VITALS
HEART RATE: 58 BPM | OXYGEN SATURATION: 97 % | SYSTOLIC BLOOD PRESSURE: 140 MMHG | DIASTOLIC BLOOD PRESSURE: 80 MMHG | WEIGHT: 182 LBS | BODY MASS INDEX: 29.25 KG/M2 | RESPIRATION RATE: 18 BRPM | HEIGHT: 66 IN

## 2025-06-11 DIAGNOSIS — I10 ESSENTIAL (PRIMARY) HYPERTENSION: ICD-10-CM

## 2025-06-11 DIAGNOSIS — R42 DIZZINESS: ICD-10-CM

## 2025-06-11 DIAGNOSIS — R73.9 HYPERGLYCEMIA: ICD-10-CM

## 2025-06-11 DIAGNOSIS — I10 ESSENTIAL (PRIMARY) HYPERTENSION: Chronic | ICD-10-CM

## 2025-06-11 DIAGNOSIS — E78.2 MIXED HYPERLIPIDEMIA: Chronic | ICD-10-CM

## 2025-06-11 DIAGNOSIS — E78.2 MIXED HYPERLIPIDEMIA: ICD-10-CM

## 2025-06-11 DIAGNOSIS — I65.23 BILATERAL CAROTID ARTERY OCCLUSION: Primary | Chronic | ICD-10-CM

## 2025-06-11 LAB
ALBUMIN SERPL-MCNC: 4 G/DL (ref 3.4–4.8)
ALBUMIN/GLOB SERPL: 1.3 RATIO (ref 1.1–2)
ALP SERPL-CCNC: 48 UNIT/L (ref 40–150)
ALT SERPL-CCNC: 18 UNIT/L (ref 0–55)
ANION GAP SERPL CALC-SCNC: 9 MEQ/L
AST SERPL-CCNC: 17 UNIT/L (ref 11–45)
BACTERIA #/AREA URNS AUTO: ABNORMAL /HPF
BASOPHILS # BLD AUTO: 0.03 X10(3)/MCL
BASOPHILS NFR BLD AUTO: 0.6 %
BILIRUB SERPL-MCNC: 0.4 MG/DL
BILIRUB UR QL STRIP.AUTO: NEGATIVE
BUN SERPL-MCNC: 17.6 MG/DL (ref 8.4–25.7)
CALCIUM SERPL-MCNC: 9.5 MG/DL (ref 8.8–10)
CHLORIDE SERPL-SCNC: 103 MMOL/L (ref 98–107)
CHOLEST SERPL-MCNC: 201 MG/DL
CHOLEST/HDLC SERPL: 4 {RATIO} (ref 0–5)
CLARITY UR: CLEAR
CO2 SERPL-SCNC: 30 MMOL/L (ref 23–31)
COLOR UR AUTO: YELLOW
CREAT SERPL-MCNC: 1.15 MG/DL (ref 0.72–1.25)
CREAT/UREA NIT SERPL: 15
CRP SERPL-MCNC: <1 MG/L
EOSINOPHIL # BLD AUTO: 0.15 X10(3)/MCL (ref 0–0.9)
EOSINOPHIL NFR BLD AUTO: 2.8 %
ERYTHROCYTE [DISTWIDTH] IN BLOOD BY AUTOMATED COUNT: 11.9 % (ref 11.5–17)
ERYTHROCYTE [SEDIMENTATION RATE] IN BLOOD: <1 MM/HR (ref 0–20)
EST. AVERAGE GLUCOSE BLD GHB EST-MCNC: 111.2 MG/DL
GFR SERPLBLD CREATININE-BSD FMLA CKD-EPI: >60 ML/MIN/1.73/M2
GLOBULIN SER-MCNC: 3 GM/DL (ref 2.4–3.5)
GLUCOSE SERPL-MCNC: 119 MG/DL (ref 82–115)
GLUCOSE UR QL STRIP: NORMAL
HBA1C MFR BLD: 5.5 %
HCT VFR BLD AUTO: 47.9 % (ref 42–52)
HDLC SERPL-MCNC: 48 MG/DL (ref 35–60)
HGB BLD-MCNC: 15.4 G/DL (ref 14–18)
HGB UR QL STRIP: NEGATIVE
HYALINE CASTS #/AREA URNS LPF: ABNORMAL /LPF
IMM GRANULOCYTES # BLD AUTO: 0.05 X10(3)/MCL (ref 0–0.04)
IMM GRANULOCYTES NFR BLD AUTO: 0.9 %
INSULIN SERPL-MCNC: 10.9 UU/ML
KETONES UR QL STRIP: NEGATIVE
LDLC SERPL CALC-MCNC: 112 MG/DL (ref 50–140)
LEUKOCYTE ESTERASE UR QL STRIP: NEGATIVE
LYMPHOCYTES # BLD AUTO: 1.28 X10(3)/MCL (ref 0.6–4.6)
LYMPHOCYTES NFR BLD AUTO: 23.7 %
MCH RBC QN AUTO: 32.4 PG (ref 27–31)
MCHC RBC AUTO-ENTMCNC: 32.2 G/DL (ref 33–36)
MCV RBC AUTO: 100.8 FL (ref 80–94)
MONOCYTES # BLD AUTO: 0.5 X10(3)/MCL (ref 0.1–1.3)
MONOCYTES NFR BLD AUTO: 9.2 %
MUCOUS THREADS URNS QL MICRO: ABNORMAL /LPF
NEUTROPHILS # BLD AUTO: 3.4 X10(3)/MCL (ref 2.1–9.2)
NEUTROPHILS NFR BLD AUTO: 62.8 %
NITRITE UR QL STRIP: NEGATIVE
NRBC BLD AUTO-RTO: 0 %
PH UR STRIP: 5 [PH]
PLATELET # BLD AUTO: 209 X10(3)/MCL (ref 130–400)
PMV BLD AUTO: 10.1 FL (ref 7.4–10.4)
POTASSIUM SERPL-SCNC: 4.7 MMOL/L (ref 3.5–5.1)
PROT SERPL-MCNC: 7 GM/DL (ref 5.8–7.6)
PROT UR QL STRIP: NEGATIVE
RBC # BLD AUTO: 4.75 X10(6)/MCL (ref 4.7–6.1)
RBC #/AREA URNS AUTO: ABNORMAL /HPF
SODIUM SERPL-SCNC: 142 MMOL/L (ref 136–145)
SP GR UR STRIP.AUTO: 1.02 (ref 1–1.03)
SQUAMOUS #/AREA URNS LPF: ABNORMAL /HPF
TRIGL SERPL-MCNC: 206 MG/DL (ref 34–140)
UROBILINOGEN UR STRIP-ACNC: NORMAL
VLDLC SERPL CALC-MCNC: 41 MG/DL
WBC # BLD AUTO: 5.41 X10(3)/MCL (ref 4.5–11.5)
WBC #/AREA URNS AUTO: ABNORMAL /HPF

## 2025-06-11 PROCEDURE — 80053 COMPREHEN METABOLIC PANEL: CPT

## 2025-06-11 PROCEDURE — 85652 RBC SED RATE AUTOMATED: CPT

## 2025-06-11 PROCEDURE — 36415 COLL VENOUS BLD VENIPUNCTURE: CPT

## 2025-06-11 PROCEDURE — 80061 LIPID PANEL: CPT

## 2025-06-11 PROCEDURE — 83036 HEMOGLOBIN GLYCOSYLATED A1C: CPT

## 2025-06-11 PROCEDURE — 83525 ASSAY OF INSULIN: CPT

## 2025-06-11 PROCEDURE — 81001 URINALYSIS AUTO W/SCOPE: CPT

## 2025-06-11 PROCEDURE — 85025 COMPLETE CBC W/AUTO DIFF WBC: CPT

## 2025-06-11 PROCEDURE — 86140 C-REACTIVE PROTEIN: CPT

## 2025-06-11 NOTE — TELEPHONE ENCOUNTER
Please let Reddy know that his blood work he did today's all stable.  He needs no further blood work here

## 2025-06-11 NOTE — PROGRESS NOTES
Brice Sawyer MD        PATIENT NAME: Reddy Thapa  : 1950  DATE: 25  MRN: [id number]      Billing Provider: Brice Sawyer MD  Level of Service: ME OFFICE/OUTPT VISIT, EST, LEVL IV, 30-39 MIN  Patient PCP Information       Provider PCP Type    Brice Sawyer MD General            Reason for Visit / Chief Complaint: Hospital Follow Up (Patient went to the hospital about 3 weeks ago because he left eye kept going blurry and his right eye got blurry and he got dizzy. He went to the ER and they weren't able to figure out what was going on. He has a CT scan tomorrow)       Update PCP  Update Chief Complaint         History of Present Illness / Problem Focused Workflow     Reddy Thapa presents to the clinic with Hospital Follow Up (Patient went to the hospital about 3 weeks ago because he left eye kept going blurry and his right eye got blurry and he got dizzy. He went to the ER and they weren't able to figure out what was going on. He has a CT scan tomorrow)     Reddy is here in hospital follow-up   He had an episode with extreme dizziness blurry vision weakness admitted to our [facility]   Evaluation revealed bilateral carotid artery disease   No evidence of stroke or embolic disease   I reviewed his data from our [facility] including a scans  Since then he has seen an eye doctor has normal eye exam but he is still having spells.    He is scheduled for further cardiac evaluation and have a carotid stent.        Review of Systems   Review of Systems   Constitutional: Negative.    HENT: Negative.     Eyes: Negative.    Respiratory: Negative.     Cardiovascular: Negative.    Gastrointestinal: Negative.    Endocrine: Negative.    Genitourinary: Negative.    Musculoskeletal: Negative.    Integumentary:  Negative.   Neurological: Negative.    Psychiatric/Behavioral: Negative.          Medical / Social / Family History     Past Medical History:   Diagnosis Date     Anxiety and depression 9/26/2023    Carotid artery disease     Essential (primary) hypertension     Fatty liver     GERD (gastroesophageal reflux disease)     Hyperlipidemia        Past Surgical History:   Procedure Laterality Date    BACK SURGERY      fusionLl3 and L4 cartliage    CARDIAC VALVE REPLACEMENT  2000    LAPAROSCOPIC REPAIR OF INGUINAL HERNIA Left     liver discection      ruptured aorta      SPINE SURGERY  2000       Social History  Mr. Thapa  reports that he has quit smoking. His smoking use included cigars. He has never used smokeless tobacco. He reports current alcohol use of about 3.0 standard drinks of alcohol per week. He reports current drug use. Frequency: 2.00 times per week. Drug: Marijuana.    Family History  Mr.'s Thapa  family history includes Alzheimer's disease in his father; COPD in his mother and sister; Emphysema in his mother; Hyperlipidemia in his brother and sister.    Medications and Allergies     Medications  Outpatient Medications Marked as Taking for the 6/11/25 encounter (Office Visit) with Brice Reyna MD   Medication Sig Dispense Refill    atorvastatin (LIPITOR) 40 MG tablet Take 40 mg by mouth once daily.      clopidogreL (PLAVIX) 75 mg tablet Take 75 mg by mouth once daily.      EScitalopram oxalate (LEXAPRO) 10 MG tablet TAKE 1 TABLET BY MOUTH EVERY DAY 90 tablet 3    HYDROcodone-acetaminophen (NORCO) 7.5-325 mg per tablet Take 1 tablet by mouth 4 (four) times daily as needed.      metoprolol succinate (TOPROL-XL) 50 MG 24 hr tablet TAKE 1 TABLET BY MOUTH EVERY DAY 90 tablet 2    RABEprazole (ACIPHEX) 20 mg tablet TAKE 1 TABLET BY MOUTH EVERY DAY 90 tablet 3    ranolazine (RANEXA) 500 MG Tb12 TAKE 1 TABLET BY MOUTH TWICE A  tablet 2    tadalafiL (CIALIS) 20 MG Tab Take 1 tablet (20 mg total) by mouth once daily. 10 tablet 11    tamsulosin (FLOMAX) 0.4 mg Cap Take 1 capsule (0.4 mg total) by mouth once daily. 30 capsule 11    valsartan (DIOVAN) 160 MG  tablet TAKE 1 TABLET BY MOUTH EVERY DAY 90 tablet 3       Allergies  Review of patient's allergies indicates:  No Known Allergies    Physical Examination     Vitals:    06/11/25 0841   BP: (!) 140/80   Pulse:    Resp:      Physical Exam  Vitals reviewed.   Constitutional:       Appearance: Normal appearance.   HENT:      Head: Normocephalic.      Right Ear: Tympanic membrane normal.      Left Ear: Tympanic membrane normal.      Nose: Nose normal.      Mouth/Throat:      Pharynx: Oropharynx is clear.   Eyes:      Extraocular Movements: Extraocular movements intact.      Pupils: Pupils are equal, round, and reactive to light.   Cardiovascular:      Rate and Rhythm: Normal rate and regular rhythm.      Pulses: Normal pulses.      Heart sounds: Normal heart sounds.   Pulmonary:      Effort: Pulmonary effort is normal.      Breath sounds: Normal breath sounds.   Abdominal:      General: Abdomen is flat. Bowel sounds are normal.      Palpations: Abdomen is soft.   Musculoskeletal:         General: Normal range of motion.      Cervical back: Normal range of motion.   Skin:     General: Skin is warm and dry.   Neurological:      General: No focal deficit present.      Mental Status: He is alert and oriented to person, place, and time.   Psychiatric:         Mood and Affect: Mood normal.         Behavior: Behavior normal.          Assessment and Plan (including Health Maintenance)      Problem List  Smart Sets  Document Outside HM   :    Plan:    ICD-10-CM ICD-9-CM   1. Bilateral carotid artery occlusion  I65.23 433.10     433.30   2. Essential (primary) hypertension  I10 401.9   3. Mixed hyperlipidemia  E78.2 272.2   4. Dizziness  R42 780.4   5. Hyperglycemia  R73.9 790.29       Problem List Items Addressed This Visit          Cardiac/Vascular    Essential (primary) hypertension (Chronic)    Slightly elevated repeat blood pressure 140/80         Hyperlipidemia (Chronic)    Stable continue medication         RESOLVED:  Carotid artery disease - Primary (Chronic)    Significant disease needing a stent at this time            Other    Dizziness    Spells associated with blurry vision   Further metabolic workup begun looking for temporal arteritis or other metabolic diseases          Other Visit Diagnoses         Hyperglycemia                       Health Maintenance Due   Topic Date Due    Hepatitis C Screening  Never done    RSV Vaccine (Age 60+ and Pregnant patients) (1 - Risk 60-74 years 1-dose series) Never done    Pneumococcal Vaccines (Age 50+) (2 of 2 - PPSV23) 11/17/2020    COVID-19 Vaccine (4 - 2024-25 season) 09/01/2024       Problem List Items Addressed This Visit          Cardiac/Vascular    Essential (primary) hypertension (Chronic)    Current Assessment & Plan   Slightly elevated repeat blood pressure 140/80         Hyperlipidemia (Chronic)    Current Assessment & Plan   Stable continue medication         RESOLVED: Carotid artery disease - Primary (Chronic)    Current Assessment & Plan   Significant disease needing a stent at this time            Other    Dizziness    Current Assessment & Plan   Spells associated with blurry vision   Further metabolic workup begun looking for temporal arteritis or other metabolic diseases          Other Visit Diagnoses         Hyperglycemia                Health Maintenance Topics with due status: Not Due       Topic Last Completion Date    Colorectal Cancer Screening 06/30/2020    TETANUS VACCINE 04/29/2021    Lipid Panel 02/19/2025       Future Appointments   Date Time Provider Department Center   9/25/2025  8:00 AM Brice Reyna MD [place] [id number]      I spent a total of 37 minutes on the day of the visit.This includes face to face time and non-face to face time preparing to see the patient (eg, review of tests), obtaining and/or reviewing separately obtained history, documenting clinical information in the electronic or other health record, independently interpreting  results and communicating results to the patient/family/caregiver, or care coordinator.        Signature:  Brice Sawyer MD  [facility]  [address] [place], [place] [address] [phone]    Date of encounter: 6/11/25

## 2025-06-11 NOTE — ASSESSMENT & PLAN NOTE
Spells associated with blurry vision   Further metabolic workup begun looking for temporal arteritis or other metabolic diseases

## 2025-09-05 DIAGNOSIS — I10 HYPERTENSION, UNSPECIFIED TYPE: ICD-10-CM

## 2025-09-05 RX ORDER — METOPROLOL SUCCINATE 50 MG/1
50 TABLET, EXTENDED RELEASE ORAL
Qty: 90 TABLET | Refills: 2 | Status: SHIPPED | OUTPATIENT
Start: 2025-09-05